# Patient Record
Sex: MALE | Race: WHITE | Employment: FULL TIME | URBAN - METROPOLITAN AREA
[De-identification: names, ages, dates, MRNs, and addresses within clinical notes are randomized per-mention and may not be internally consistent; named-entity substitution may affect disease eponyms.]

---

## 2021-12-16 ENCOUNTER — APPOINTMENT (OUTPATIENT)
Dept: GENERAL RADIOLOGY | Facility: CLINIC | Age: 56
DRG: 247 | End: 2021-12-16
Attending: EMERGENCY MEDICINE
Payer: COMMERCIAL

## 2021-12-16 ENCOUNTER — HOSPITAL ENCOUNTER (INPATIENT)
Facility: CLINIC | Age: 56
LOS: 1 days | Discharge: HOME | DRG: 247 | End: 2021-12-18
Attending: EMERGENCY MEDICINE | Admitting: EMERGENCY MEDICINE
Payer: COMMERCIAL

## 2021-12-16 ENCOUNTER — NURSE TRIAGE (OUTPATIENT)
Dept: PEDIATRICS | Facility: CLINIC | Age: 56
End: 2021-12-16
Payer: COMMERCIAL

## 2021-12-16 DIAGNOSIS — R07.9 CHEST PAIN, UNSPECIFIED TYPE: ICD-10-CM

## 2021-12-16 DIAGNOSIS — S01.512A LACERATION OF TONGUE, INITIAL ENCOUNTER: ICD-10-CM

## 2021-12-16 DIAGNOSIS — I25.118 CORONARY ARTERY DISEASE OF NATIVE ARTERY OF NATIVE HEART WITH STABLE ANGINA PECTORIS (H): Primary | ICD-10-CM

## 2021-12-16 DIAGNOSIS — I10 ESSENTIAL HYPERTENSION: ICD-10-CM

## 2021-12-16 DIAGNOSIS — Z20.822 LAB TEST NEGATIVE FOR COVID-19 VIRUS: ICD-10-CM

## 2021-12-16 LAB
ALBUMIN SERPL-MCNC: 4.2 G/DL (ref 3.4–5)
ALP SERPL-CCNC: 64 U/L (ref 40–150)
ALT SERPL W P-5'-P-CCNC: 135 U/L (ref 0–70)
ANION GAP SERPL CALCULATED.3IONS-SCNC: 4 MMOL/L (ref 3–14)
AST SERPL W P-5'-P-CCNC: 64 U/L (ref 0–45)
ATRIAL RATE - MUSE: 79 BPM
BASOPHILS # BLD AUTO: 0 10E3/UL (ref 0–0.2)
BASOPHILS NFR BLD AUTO: 1 %
BILIRUB SERPL-MCNC: 0.7 MG/DL (ref 0.2–1.3)
BUN SERPL-MCNC: 23 MG/DL (ref 7–30)
CALCIUM SERPL-MCNC: 9.7 MG/DL (ref 8.5–10.1)
CHLORIDE BLD-SCNC: 107 MMOL/L (ref 94–109)
CO2 SERPL-SCNC: 26 MMOL/L (ref 20–32)
CREAT SERPL-MCNC: 0.98 MG/DL (ref 0.66–1.25)
DIASTOLIC BLOOD PRESSURE - MUSE: NORMAL MMHG
EOSINOPHIL # BLD AUTO: 0.1 10E3/UL (ref 0–0.7)
EOSINOPHIL NFR BLD AUTO: 1 %
ERYTHROCYTE [DISTWIDTH] IN BLOOD BY AUTOMATED COUNT: 11.5 % (ref 10–15)
GFR SERPL CREATININE-BSD FRML MDRD: 86 ML/MIN/1.73M2
GLUCOSE BLD-MCNC: 90 MG/DL (ref 70–99)
HCT VFR BLD AUTO: 45.5 % (ref 40–53)
HGB BLD-MCNC: 15.7 G/DL (ref 13.3–17.7)
HOLD SPECIMEN: NORMAL
HOLD SPECIMEN: NORMAL
IMM GRANULOCYTES # BLD: 0 10E3/UL
IMM GRANULOCYTES NFR BLD: 0 %
INTERPRETATION ECG - MUSE: NORMAL
LACTATE SERPL-SCNC: 0.8 MMOL/L (ref 0.7–2)
LYMPHOCYTES # BLD AUTO: 2.1 10E3/UL (ref 0.8–5.3)
LYMPHOCYTES NFR BLD AUTO: 43 %
MCH RBC QN AUTO: 34.1 PG (ref 26.5–33)
MCHC RBC AUTO-ENTMCNC: 34.5 G/DL (ref 31.5–36.5)
MCV RBC AUTO: 99 FL (ref 78–100)
MONOCYTES # BLD AUTO: 0.6 10E3/UL (ref 0–1.3)
MONOCYTES NFR BLD AUTO: 13 %
NEUTROPHILS # BLD AUTO: 2 10E3/UL (ref 1.6–8.3)
NEUTROPHILS NFR BLD AUTO: 42 %
NRBC # BLD AUTO: 0 10E3/UL
NRBC BLD AUTO-RTO: 0 /100
NT-PROBNP SERPL-MCNC: 55 PG/ML (ref 0–900)
P AXIS - MUSE: 51 DEGREES
PLATELET # BLD AUTO: 190 10E3/UL (ref 150–450)
POTASSIUM BLD-SCNC: 4.5 MMOL/L (ref 3.4–5.3)
PR INTERVAL - MUSE: 156 MS
PROT SERPL-MCNC: 8.1 G/DL (ref 6.8–8.8)
QRS DURATION - MUSE: 78 MS
QT - MUSE: 380 MS
QTC - MUSE: 435 MS
R AXIS - MUSE: -4 DEGREES
RBC # BLD AUTO: 4.61 10E6/UL (ref 4.4–5.9)
SARS-COV-2 RNA RESP QL NAA+PROBE: NEGATIVE
SODIUM SERPL-SCNC: 137 MMOL/L (ref 133–144)
SYSTOLIC BLOOD PRESSURE - MUSE: NORMAL MMHG
T AXIS - MUSE: -4 DEGREES
TROPONIN I SERPL HS-MCNC: 30 NG/L
TROPONIN I SERPL HS-MCNC: 57 NG/L
VENTRICULAR RATE- MUSE: 79 BPM
WBC # BLD AUTO: 4.9 10E3/UL (ref 4–11)

## 2021-12-16 PROCEDURE — 84484 ASSAY OF TROPONIN QUANT: CPT | Performed by: EMERGENCY MEDICINE

## 2021-12-16 PROCEDURE — 250N000013 HC RX MED GY IP 250 OP 250 PS 637: Performed by: EMERGENCY MEDICINE

## 2021-12-16 PROCEDURE — G0378 HOSPITAL OBSERVATION PER HR: HCPCS

## 2021-12-16 PROCEDURE — 36415 COLL VENOUS BLD VENIPUNCTURE: CPT | Performed by: EMERGENCY MEDICINE

## 2021-12-16 PROCEDURE — 83605 ASSAY OF LACTIC ACID: CPT | Performed by: EMERGENCY MEDICINE

## 2021-12-16 PROCEDURE — 85025 COMPLETE CBC W/AUTO DIFF WBC: CPT | Performed by: EMERGENCY MEDICINE

## 2021-12-16 PROCEDURE — 83880 ASSAY OF NATRIURETIC PEPTIDE: CPT | Performed by: EMERGENCY MEDICINE

## 2021-12-16 PROCEDURE — U0003 INFECTIOUS AGENT DETECTION BY NUCLEIC ACID (DNA OR RNA); SEVERE ACUTE RESPIRATORY SYNDROME CORONAVIRUS 2 (SARS-COV-2) (CORONAVIRUS DISEASE [COVID-19]), AMPLIFIED PROBE TECHNIQUE, MAKING USE OF HIGH THROUGHPUT TECHNOLOGIES AS DESCRIBED BY CMS-2020-01-R: HCPCS | Performed by: PHYSICIAN ASSISTANT

## 2021-12-16 PROCEDURE — 80053 COMPREHEN METABOLIC PANEL: CPT | Performed by: EMERGENCY MEDICINE

## 2021-12-16 PROCEDURE — 71046 X-RAY EXAM CHEST 2 VIEWS: CPT

## 2021-12-16 PROCEDURE — 84484 ASSAY OF TROPONIN QUANT: CPT | Mod: 91 | Performed by: EMERGENCY MEDICINE

## 2021-12-16 PROCEDURE — 250N000011 HC RX IP 250 OP 636: Performed by: EMERGENCY MEDICINE

## 2021-12-16 RX ORDER — ASPIRIN 81 MG/1
81 TABLET ORAL DAILY
Status: ON HOLD | COMMUNITY
End: 2021-12-18

## 2021-12-16 RX ORDER — ASPIRIN 81 MG/1
324 TABLET, CHEWABLE ORAL ONCE
Status: COMPLETED | OUTPATIENT
Start: 2021-12-16 | End: 2021-12-16

## 2021-12-16 RX ORDER — HYDRALAZINE HYDROCHLORIDE 20 MG/ML
10 INJECTION INTRAMUSCULAR; INTRAVENOUS ONCE
Status: COMPLETED | OUTPATIENT
Start: 2021-12-16 | End: 2021-12-16

## 2021-12-16 RX ADMIN — ASPIRIN 81 MG 324 MG: 81 TABLET ORAL at 14:47

## 2021-12-16 RX ADMIN — HYDRALAZINE HYDROCHLORIDE 10 MG: 20 INJECTION INTRAMUSCULAR; INTRAVENOUS at 16:38

## 2021-12-16 NOTE — Clinical Note
The first balloon was inserted into the right coronary artery and middle right coronary artery.Max pressure = 12 ulises. Total duration = 10 seconds.     Max pressure = 12 ulises. Total duration = 10 seconds.    Balloon reinflated a second time: Max pressure = 12 ulises. Total duration = 10 seconds.

## 2021-12-16 NOTE — Clinical Note
Stent deployed in the middle right coronary artery. Max pressure = 12 ulises. Total duration = 10 seconds. Balloon reinflated a second time: Max pressure = 12 ulises. Total duration = 8 seconds. 2.75x12 [employer]nergy

## 2021-12-16 NOTE — ED NOTES
Pt states chest pain has been present for a few days.  Pt also states extreme carpal tunnel that doesn't allow pt to sleep. Pt states has on/off shingles that also affects body.

## 2021-12-16 NOTE — ED PROVIDER NOTES
ED Provider Note  [facility]      History     Chief Complaint   Patient presents with     Chest Pain     HPI  Thang Cagle is a 56 year old male who presents with intermittent chest pain for the past few weeks.  The patient works as a  and states he has noted more frequent chest pain with walking and when he is running around work or going upstairs.  This pain can last throughout his time of activity.  It gets better with rest.  It is located in the substernal area of his chest.  It is not associated with radiation, shortness of breath, diaphoresis, nausea or vomiting.  This has worsened over the past 2 to 3 weeks.  He had shingles about 15 years ago and he has ongoing intermittent aching pain from this.  He initially thought his symptoms may be due to shingles but now feels like this kind of pain is different.  He does have borderline hypertension and states his blood pressure is usually in the 140s over 90s and he checks it daily at home.  He does not take any medications for blood pressure, but he takes a baby aspirin a day and also takes many vitamins including Q-10 vitamins.  He does note he has had 2 Covid shots.  He has a strong family history of heart disease.  He himself has not been to the doctor since his hernia surgery many years ago.  The patient states he used to be quite active and used to bike to work.    Social: No tobacco use, works as a   Family history mother had an MI at age 50, granddad  with an MI at age 52    Past Medical History  No past medical history on file.  No past surgical history on file.  ibuprofen (ADVIL,MOTRIN) 800 MG tablet      No Known Allergies  Family History  No family history on file.  Social History   Social History     Tobacco Use     Smoking status: Never Smoker     Smokeless tobacco: Never Used   Substance Use Topics     Alcohol use: Yes     Comment: 3-4 wk     Drug use: No      Past medical history, past surgical  history, medications, allergies, family history, and social history were reviewed with the patient. No additional pertinent items.       Review of Systems  A complete review of systems was performed with pertinent positives and negatives noted in the HPI, and all other systems negative.    Physical Exam   BP: (!) 170/109  Pulse: 88  Temp: 98  F (36.7  C)  Resp: 18  SpO2: 100 %  Physical Exam  Physical Exam   Constitutional:   well nourished, well developed, resting comfortably   HENT:   Head: Normocephalic and atraumatic.   Eyes: Conjunctivae are normal. Pupils are equal, round, and reactive to light.   pharynx has no erythema or exudate, mucous membranes are moist  Neck:   no adenopathy, no bony tenderness  Cardiovascular: regular rate and rhythm without murmurs or gallops  Pulmonary/Chest: Clear to auscultation bilaterally, with no wheezes or retractions. No respiratory distress.  GI: Soft with good bowel sounds.  Non-tender, non-distended, with no guarding, no rebound, no peritoneal signs.   Back:  No bony or CVA tenderness   Musculoskeletal:  no edema  Skin: Skin is warm and dry. No rash noted.   Neurological: alert and oriented to person, place, and time. Nonfocal exam  Psychiatric:  normal mood and affect.   ED Course      Procedures       The medical record was reviewed and interpreted.  Current labs reviewed and interpreted.  Current images reviewed and interpreted: see below.  EKG reviewed and interpreted: see below.              EKG Interpretation:      Interpreted by Geetha Garcia MD  Time reviewed: 1430 pm   Symptoms at time of EKG: see hpi   Rhythm: Normal sinus   Rate: Normal  Axis: Normal  Ectopy: None  Conduction: Normal  ST Segments/ T Waves: Non-specific ST-T wave changes  Q Waves: None  Comparison to prior: No old EKG available    Clinical Impression: Normal sinus rhythm, rate of 79 bpm, with nonspecific ST-T wave changes        Results for orders placed or performed during the hospital  encounter of 12/16/21   XR Chest 2 Views     Status: None    Narrative    CHEST TWO VIEWS 12/16/2021 3:07 PM     HISTORY: Chest pain    COMPARISON: None.    FINDINGS: Heart size and pulmonary vascularity are within normal  limits. The lungs are clear. No pneumothorax or pleural effusion.       Impression    IMPRESSION: No radiographic evidence of acute chest abnormality.     JAKY CARLIN MD         SYSTEM ID:  [id number]   Comprehensive metabolic panel     Status: Abnormal   Result Value Ref Range    Sodium 137 133 - 144 mmol/L    Potassium 4.5 3.4 - 5.3 mmol/L    Chloride 107 94 - 109 mmol/L    Carbon Dioxide (CO2) 26 20 - 32 mmol/L    Anion Gap 4 3 - 14 mmol/L    Urea Nitrogen 23 7 - 30 mg/dL    Creatinine 0.98 0.66 - 1.25 mg/dL    Calcium 9.7 8.5 - 10.1 mg/dL    Glucose 90 70 - 99 mg/dL    Alkaline Phosphatase 64 40 - 150 U/L    AST 64 (H) 0 - 45 U/L     (H) 0 - 70 U/L    Protein Total 8.1 6.8 - 8.8 g/dL    Albumin 4.2 3.4 - 5.0 g/dL    Bilirubin Total 0.7 0.2 - 1.3 mg/dL    GFR Estimate 86 >60 mL/min/1.73m2   Lactic acid whole blood     Status: Normal   Result Value Ref Range    Lactic Acid 0.8 0.7 - 2.0 mmol/L   Troponin I     Status: Normal   Result Value Ref Range    Troponin I High Sensitivity 30 <79 ng/L   BNP     Status: Normal   Result Value Ref Range    N terminal Pro BNP Inpatient 55 0 - 900 pg/mL   CBC with platelets and differential     Status: Abnormal   Result Value Ref Range    WBC Count 4.9 4.0 - 11.0 10e3/uL    RBC Count 4.61 4.40 - 5.90 10e6/uL    Hemoglobin 15.7 13.3 - 17.7 g/dL    Hematocrit 45.5 40.0 - 53.0 %    MCV 99 78 - 100 fL    MCH 34.1 (H) 26.5 - 33.0 pg    MCHC 34.5 31.5 - 36.5 g/dL    RDW 11.5 10.0 - 15.0 %    Platelet Count 190 150 - 450 10e3/uL    % Neutrophils 42 %    % Lymphocytes 43 %    % Monocytes 13 %    % Eosinophils 1 %    % Basophils 1 %    % Immature Granulocytes 0 %    NRBCs per 100 WBC 0 <1 /100    Absolute Neutrophils 2.0 1.6 - 8.3 10e3/uL    Absolute  Lymphocytes 2.1 0.8 - 5.3 10e3/uL    Absolute Monocytes 0.6 0.0 - 1.3 10e3/uL    Absolute Eosinophils 0.1 0.0 - 0.7 10e3/uL    Absolute Basophils 0.0 0.0 - 0.2 10e3/uL    Absolute Immature Granulocytes 0.0 <=0.4 10e3/uL    Absolute NRBCs 0.0 10e3/uL   Extra Blue Top Tube     Status: None   Result Value Ref Range    Hold Specimen JIC    Extra Red Top Tube     Status: None   Result Value Ref Range    Hold Specimen JIC    EKG 12 lead     Status: None   Result Value Ref Range    Systolic Blood Pressure  mmHg    Diastolic Blood Pressure  mmHg    Ventricular Rate 79 BPM    Atrial Rate 79 BPM    CT Interval 156 ms    QRS Duration 78 ms     ms    QTc 435 ms    P Axis 51 degrees    R AXIS -4 degrees    T Axis -4 degrees    Interpretation ECG       Sinus rhythm  Nonspecific ST abnormality  Abnormal ECG  Unconfirmed report - interpretation of this ECG is computer generated - see medical record for final interpretation  Confirmed by - EMERGENCY ROOM, PHYSICIAN ([id number]),  SIMI KEATING ([id number]) on 12/16/2021 3:21:04 PM     CBC with platelets differential     Status: Abnormal    Narrative    The following orders were created for panel order CBC with platelets differential.  Procedure                               Abnormality         Status                     ---------                               -----------         ------                     CBC with platelets and d...[[id number]]  Abnormal            Final result                 Please view results for these tests on the individual orders.   [place] Draw     Status: None    Narrative    The following orders were created for panel order [place] Draw.  Procedure                               Abnormality         Status                     ---------                               -----------         ------                     Extra Blue Top Tube[[id number]]                              Final result               Extra Red Top Tube[[id number]]                                Final result                 Please view results for these tests on the individual orders.     Medications   aspirin (ASA) chewable tablet 324 mg (324 mg Oral Given 12/16/21 1447)   hydrALAZINE (APRESOLINE) injection 10 mg (10 mg Intravenous Given 12/16/21 1638)        Assessments & Plan (with Medical Decision Making)     I have reviewed the nursing notes.  Emergency Department course:  The patient was seen and examined at 1420 pm.      EKG shows Normal sinus rhythm, rate of 79 bpm, with nonspecific ST-T wave changes.  I treated the patient with 4 baby aspirin p.o.    Laboratory studies show an unremarkable CBC and comprehensive metabolic panel, apart from elevated LFTs, with an AST of 64 and ALT of 135.  Lactate is normal at 0.8.  BNP is normal at 55.  Initial Troponin I is 30.    Portable chest x-ray shows IMPRESSION: No radiographic evidence of acute chest abnormality.   The patient is persistently hypertensive here.  He does state he takes his blood pressure almost daily at home and it is usually in the 140s over 90s.  However, in the ED he is initially 170/109 with a repeat of 165/118.  I treated him with hydralazine IV for hypertension.    The patient is a 56-year-old male who presents with chest pain, worsening with activity, and  worsening over the past 2 weeks.  He has a family history of coronary artery disease.  He is hypertensive and not on antihypertensive medications.  He has received aspirin p.o. and hydralazine IV.  He will be admitted to the ED observation unit on the [facility] for cardiac rule out and further risk stratification. .  He is admitted under the care of [facility] El[facility] Alvares.   A repeat troponin is pending at the time of this dictation.         I have reviewed the findings, diagnosis, plan and need for follow up with the patient.    New Prescriptions    No medications on file       Final diagnoses:   Chest pain, unspecified type   Essential hypertension       --This note was created  in part by the use of Dragon voice recognition dictation system. Inadvertent grammatical errors and typographical errors may still exist.  MD Geetha King  [facility] EMERGENCY DEPARTMENT  12/16/2021     Geetha Garcia MD  12/16/21 3135

## 2021-12-16 NOTE — Clinical Note
The first balloon was inserted into the left anterior descending and middle left anterior descending.Max pressure = 12 ulises. Total duration = 14 seconds.

## 2021-12-16 NOTE — Clinical Note
The first balloon was inserted into the left anterior descending and middle left anterior descending.Max pressure = 10 ulises. Total duration = 8 seconds.     Max pressure = 12 ulises. Total duration = 9 seconds.    Balloon reinflated a second time: Max pressure = 12 ulises. Total duration = 9 seconds.  Balloon reinflated a third time: Max pressure = 16 ulises. Total duration = 14 seconds.

## 2021-12-16 NOTE — Clinical Note
Stent deployed in the middle left anterior descending. Max pressure = 12 ulises. Total duration = 8 seconds.

## 2021-12-16 NOTE — H&P
[facility] ED Observation Admission Note    Chief Complaint   Patient presents with     Chest Pain       Assessment/Plan:  Thang Cagle is a 56 year old male who presents with intermittent chest pain for the past few weeks.  The patient works as a  and states he has noted more frequent chest pain with walking and when he is running around work or going upstairs.     1. Chest pain:   Intermittent substernal chest pain that has been going for few months, more frequent and getting worse in the last few weeks. Chest pain worse with exertion and is alleviated with rest. Denies associated SOB, radiation, palpitation, LE edema, diaphoresis, nausea or vomiting. Denies previous cardiovascular hx. He does have strong family hx of cardiac disease. Mother had an MI at age 50, grandfather  from MI complications at age 52. Denies history of DVT/PE. [place] tobacco use. In the ED, he is afebrile. /109-170/118. BP at home in the 140s to 90s. His not currently on any antihypertensives. P 88 RR 18 O2 100% RA. LAB: Na 137 K+4.5 Cr 0.98 BUN 23 AST/ALT mildly elevated 64/135 respectively. No previous value to compare on the chart. WBC 4.9 HGB 15.7. Trop x 1 negative. EKG shows non-specific T-waves otherwise no ischemic changes. Chest Xray, no radiographic evidence of acute chest abnormality. Risk Factors include HNT and family history or cardiac disease.  - Serial troponins q4h x 2 more  - Continuous telemetry  - Stress Test in the morning     - Nitro PRN  - ASA 81mg daily  - Clear liquid diet after midnight    2. Hypertension. Hypertensive in he ED. /109-170/118. BP at home in the 140s to 90s. Hydralazine 10 mg IV x 1 was administered in the ed. Will continue to monitor BP.   -Tele  -Hydralazine prn >160/90  -Consider starting antihypertensive if no improvement.  -BMP in am        Addendum:   Per nursing staff, patient did not come to [place] ed observation unit overnight due to bed availability. He had  3rd  troponin done this morning, nearly 12 hours after the 2nd troponin which was elevated. He also had a repeat EKG at 8:52 am with no significant changes. Discussed case with ED MD at [facility]. Per Dr. Gaston, he discussed case with cardiology who agreed to proceed with urgent catheterization. Plan is patient to transfer to [place] ED and undergo cardiac catheterization today. Patient failed ed observation stay at this time and will be transferred to [facility].             HPI:    Thang Cagle is a 56 year old male who presents with intermittent chest pain for the past few weeks.  The patient works as a  and states he has noted more frequent chest pain with walking and when he is running around work or going upstairs.  This pain can last throughout his time of activity.  It gets better with rest.  It is located in the substernal area of his chest.  It is not associated with radiation, shortness of breath, diaphoresis, nausea or vomiting.  This has worsened over the past 2 to 3 weeks.  He had shingles about 15 years ago and he has ongoing aching pain from this.  He initially thought it may be shingles but now feels like this kind of pain is different.  He does have borderline hypertension and states his blood pressure is usually in the 140s over 90s and he checks it daily at home.  He takes a baby aspirin a day and also takes many vitamins including every 10 vitamins.  He does note he has had 2 Covid shots.  He has a strong family history of heart disease.  He himself has not been to the doctor since his hernia surgery many years ago.  The patient states he used to be quite active and used to bike to work.         In the ED In the ED, he is afebrile. /109-170/118. BP at home in the 140s to 90s. His not currently on any antihypertensives. P 88 RR 18 O2 100% RA. LAB: Na 137 K+4.5 Cr 0.98 BUN 23 AST/ALT mildly elevated 64/135 respectively. No previous value to compare on the chart. WBC 4.9 HGB 15.7. Trop x 1  negative. EKG shows non-specific T-waves otherwise no ischemic changes. Chest Xray, no radiographic evidence of acute chest abnormality. Risk Factors include HNT and family history or cardiac disease.    On admission to the observation unit the patient was stable    History:    No past medical history on file.    No past surgical history on file.    No family history on file.    Social History     Socioeconomic History     Marital status: Single     Spouse name: Not on file     Number of children: Not on file     Years of education: Not on file     Highest education level: Not on file   Occupational History     Not on file   Tobacco Use     Smoking status: Never Smoker     Smokeless tobacco: Never Used   Substance and Sexual Activity     Alcohol use: Yes     Comment: 3-4 wk     Drug use: No     Sexual activity: Yes     Partners: Female   Other Topics Concern     Parent/sibling w/ CABG, MI or angioplasty before 65F 55M? Not Asked   Social History Narrative     Not on file     Social Determinants of Health     Financial Resource Strain: Not on file   Food Insecurity: Not on file   Transportation Needs: Not on file   Physical Activity: Not on file   Stress: Not on file   Social Connections: Not on file   Intimate Partner Violence: Not on file   Housing Stability: Not on file       No current facility-administered medications on file prior to encounter.  ibuprofen (ADVIL,MOTRIN) 800 MG tablet, Take 1 tablet by mouth every 8 hours as needed for pain.        Data:    Results for orders placed or performed during the hospital encounter of 12/16/21   XR Chest 2 Views     Status: None    Narrative    CHEST TWO VIEWS 12/16/2021 3:07 PM     HISTORY: Chest pain    COMPARISON: None.    FINDINGS: Heart size and pulmonary vascularity are within normal  limits. The lungs are clear. No pneumothorax or pleural effusion.       Impression    IMPRESSION: No radiographic evidence of acute chest abnormality.     JAKY CARLIN MD          SYSTEM ID:  [id number]   Comprehensive metabolic panel     Status: Abnormal   Result Value Ref Range    Sodium 137 133 - 144 mmol/L    Potassium 4.5 3.4 - 5.3 mmol/L    Chloride 107 94 - 109 mmol/L    Carbon Dioxide (CO2) 26 20 - 32 mmol/L    Anion Gap 4 3 - 14 mmol/L    Urea Nitrogen 23 7 - 30 mg/dL    Creatinine 0.98 0.66 - 1.25 mg/dL    Calcium 9.7 8.5 - 10.1 mg/dL    Glucose 90 70 - 99 mg/dL    Alkaline Phosphatase 64 40 - 150 U/L    AST 64 (H) 0 - 45 U/L     (H) 0 - 70 U/L    Protein Total 8.1 6.8 - 8.8 g/dL    Albumin 4.2 3.4 - 5.0 g/dL    Bilirubin Total 0.7 0.2 - 1.3 mg/dL    GFR Estimate 86 >60 mL/min/1.73m2   Lactic acid whole blood     Status: Normal   Result Value Ref Range    Lactic Acid 0.8 0.7 - 2.0 mmol/L   Troponin I     Status: Normal   Result Value Ref Range    Troponin I High Sensitivity 30 <79 ng/L   BNP     Status: Normal   Result Value Ref Range    N terminal Pro BNP Inpatient 55 0 - 900 pg/mL   CBC with platelets and differential     Status: Abnormal   Result Value Ref Range    WBC Count 4.9 4.0 - 11.0 10e3/uL    RBC Count 4.61 4.40 - 5.90 10e6/uL    Hemoglobin 15.7 13.3 - 17.7 g/dL    Hematocrit 45.5 40.0 - 53.0 %    MCV 99 78 - 100 fL    MCH 34.1 (H) 26.5 - 33.0 pg    MCHC 34.5 31.5 - 36.5 g/dL    RDW 11.5 10.0 - 15.0 %    Platelet Count 190 150 - 450 10e3/uL    % Neutrophils 42 %    % Lymphocytes 43 %    % Monocytes 13 %    % Eosinophils 1 %    % Basophils 1 %    % Immature Granulocytes 0 %    NRBCs per 100 WBC 0 <1 /100    Absolute Neutrophils 2.0 1.6 - 8.3 10e3/uL    Absolute Lymphocytes 2.1 0.8 - 5.3 10e3/uL    Absolute Monocytes 0.6 0.0 - 1.3 10e3/uL    Absolute Eosinophils 0.1 0.0 - 0.7 10e3/uL    Absolute Basophils 0.0 0.0 - 0.2 10e3/uL    Absolute Immature Granulocytes 0.0 <=0.4 10e3/uL    Absolute NRBCs 0.0 10e3/uL   Extra Blue Top Tube     Status: None   Result Value Ref Range    Hold Specimen JIC    Extra Red Top Tube     Status: None   Result Value Ref Range    Hold  Specimen [facility]    EKG 12 lead     Status: None   Result Value Ref Range    Systolic Blood Pressure  mmHg    Diastolic Blood Pressure  mmHg    Ventricular Rate 79 BPM    Atrial Rate 79 BPM    NV Interval 156 ms    QRS Duration 78 ms     ms    QTc 435 ms    P Axis 51 degrees    R AXIS -4 degrees    T Axis -4 degrees    Interpretation ECG       Sinus rhythm  Nonspecific ST abnormality  Abnormal ECG  Unconfirmed report - interpretation of this ECG is computer generated - see medical record for final interpretation  Confirmed by - EMERGENCY ROOM, PHYSICIAN ([id number]),  SIMI KEATING ([id number]) on 12/16/2021 3:21:04 PM     CBC with platelets differential     Status: Abnormal    Narrative    The following orders were created for panel order CBC with platelets differential.  Procedure                               Abnormality         Status                     ---------                               -----------         ------                     CBC with platelets and d...[[id number]]  Abnormal            Final result                 Please view results for these tests on the individual orders.   [place] Draw     Status: None    Narrative    The following orders were created for panel order [place] Draw.  Procedure                               Abnormality         Status                     ---------                               -----------         ------                     Extra Blue Top Tube[[id number]]                              Final result               Extra Red Top Tube[[id number]]                               Final result                 Please view results for these tests on the individual orders.             EKG Interpretation:      Interpreted by Geetha Garcia MD  Time reviewed: 1430 pm   Symptoms at time of EKG: see hpi   Rhythm: Normal sinus   Rate: Normal  Axis: Normal  Ectopy: None  Conduction: Normal  ST Segments/ T Waves: Non-specific ST-T wave changes  Q Waves: None  Comparison to prior: No old  EKG available     Clinical Impression: Normal sinus rhythm, rate of 79 bpm, with nonspecific ST-T wave changes    ROS:  REVIEW OF SYSTEMS:   General: fatigue   EYES: Negative for vision changes or eye problems   ENT: No problems with ears, nose or throat. No difficulty swallowing.   RESP: No coughing, wheezing or shortness of breath   CV: Positive for chest pains with exertion   GI: No nausea, vomiting, heartburn, abdominal pain, diarrhea, constipation or change in bowel habits   : No urinary frequency or dysuria, bladder or kidney problems   MUSCULOSKELETAL: No significant muscle or joint pains   NEUROLOGIC: No headaches, numbness, tingling, weakness, problems with balance or coordination   PSYCHIATRIC: No problems with anxiety, depression or mental health   HEME/IMMUNE/ALLERGY: No history of bleeding or clotting problems or anemia. No allergies or immune system problems   ENDOCRINE: No history of thyroid disease, diabetes or other endocrine disorders   SKIN: No rashes,worrisome lesions or skin problems    PCP:   CARDIAC RISK: HTN    10 point ROS negative other than the symptoms noted above.      Exam:    Vitals:  B/P: 153/112, T: 98, P: 80, R: 20    Physical Exam   Constitutional: Pt is oriented to person, place, and time.Pt appears well-developed and well-nourished.   HENT:   Head: Normocephalic and atraumatic.   Eyes: Conjunctivae are normal. Pupils are equal, round, and reactive to light.   Neck: Normal range of motion. Neck supple.   Cardiovascular: Normal rate, regular rhythm, normal heart sounds and intact distal pulses.    Pulmonary/Chest: Effort normal and breath sounds normal. No respiratory distress. Pt has no wheezes. Pt has no rales  Abdominal: Soft. Bowel sounds are normal. Pt exhibits no distension and no mass. No tenderness. Pt has no rebound and no guarding.   Musculoskeletal: Normal range of motion. Pt exhibits no edema.   Neurological: Pt is alert and oriented to person, place, and time. Normal  reflexes.   Skin: Skin is warm and dry. No rash noted.   Psychiatric: Pt has a normal mood and affect. Behavior is normal. Judgment and thought content normal.       Consults: Cardiology   FEN: NPO  DVT prophylaxis: Early ambulation  Code Status: Full  Disposition: Stable vital signs. Patient return to baseline.  All labs/images reviewed    Signed:  Eliza Alvares PA-C  December 16, 2021 at 4:45 PM

## 2021-12-16 NOTE — Clinical Note
The first balloon was inserted into the left anterior descending and middle left anterior descending.Max pressure = 12 ulises. Total duration = 11 seconds.

## 2021-12-16 NOTE — TELEPHONE ENCOUNTER
Advised ER evaluation. Do not drive yourself.   The Pt agrees with plan of care.     Genoveva Sharma RN   [facility]  -- Triage Nurse        Reason for Disposition    Chest pain lasting longer than 5 minutes and occurred in last 3 days (72 hours) (Exception: feels exactly the same as previously diagnosed heartburn and has accompanying sour taste in mouth)    Additional Information    Negative: Severe difficulty breathing (e.g., struggling for each breath, speaks in single words)    Negative: Passed out (i.e., fainted, collapsed and was not responding)    Negative: Difficult to awaken or acting confused (e.g., disoriented, slurred speech)    Negative: Shock suspected (e.g., cold/pale/clammy skin, too weak to stand, low BP, rapid pulse)    Negative: Chest pain lasting longer than 5 minutes and ANY of the following:* Over 45 years old* Over 30 years old and at least one cardiac risk factor (e.g., diabetes, high blood pressure, high cholesterol, smoker, or strong family history of heart disease)* History of heart disease (i.e., angina, heart attack, heart failure, bypass surgery, takes nitroglycerin)* Pain is crushing, pressure-like, or heavy    Negative: Heart beating < 50 beats per minute OR > 140 beats per minute    Negative: Visible sweat on face or sweat dripping down face    Negative: Sounds like a life-threatening emergency to the triager    Negative: Followed an injury to chest    Negative: SEVERE chest pain    Negative: Pain also in shoulder(s) or arm(s) or jaw    Negative: Difficulty breathing    Negative: Cocaine use within last 3 days    Negative: Major surgery in the past month    Negative: Hip or leg fracture (broken bone) in past month (or had cast on leg or ankle in past month)    Negative: Illness requiring prolonged bedrest in past month (e.g., immobilization, long hospital stay)    Negative: Long-distance travel in past month (e.g., car, bus, train, plane; with trip lasting 6 or more hours)     "Negative: History of prior 'blood clot' in leg or lungs (i.e., deep vein thrombosis, pulmonary embolism)    Negative: History of inherited increased risk of blood clots (e.g., Factor 5 Leiden, Anti-thrombin 3, Protein C or Protein S deficiency, Prothrombin mutation)    Negative: Heart beating irregularly or very rapidly    Answer Assessment - Initial Assessment Questions  1. LOCATION: \"Where does it hurt?\"        Not currently having pain. Pain is only when doing strenuous activities. Feels like heartburn, resolves once activity stops.   2. RADIATION: \"Does the pain go anywhere else?\" (e.g., into neck, jaw, arms, back)      Does not radiate.   3. ONSET: \"When did the chest pain begin?\" (Minutes, hours or days)       Chest pain started about a year ago, off and on, worse over the last month.   4. PATTERN \"Does the pain come and go, or has it been constant since it started?\"  \"Does it get worse with exertion?\"       Intermittent, with activity.   5. DURATION: \"How long does it last\" (e.g., seconds, minutes, hours)      Pain can last up to a few hours.   6. SEVERITY: \"How bad is the pain?\"  (e.g., Scale 1-10; mild, moderate, or severe)     - MILD (1-3): doesn't interfere with normal activities      - MODERATE (4-7): interferes with normal activities or awakens from sleep     - SEVERE (8-10): excruciating pain, unable to do any normal activities        When present, pain is near a 6/10.   7. CARDIAC RISK FACTORS: \"Do you have any history of heart problems or risk factors for heart disease?\" (e.g., angina, prior heart attack; diabetes, high blood pressure, high cholesterol, smoker, or strong family history of heart disease)      Strong family hx of MI at young age, has had some high BP's in the past. Not on medication. Has not been to the doctor in 25 years.   8. PULMONARY RISK FACTORS: \"Do you have any history of lung disease?\"  (e.g., blood clots in lung, asthma, emphysema, birth control pills)      None.   9. CAUSE: " "\"What do you think is causing the chest pain?\"      Unknown.   10. OTHER SYMPTOMS: \"Do you have any other symptoms?\" (e.g., dizziness, nausea, vomiting, sweating, fever, difficulty breathing, cough)        Denies any other symptoms.   11. PREGNANCY: \"Is there any chance you are pregnant?\" \"When was your last menstrual period?\"        NA    Protocols used: CHEST PAIN-A-OH      "

## 2021-12-16 NOTE — Clinical Note
The first balloon was inserted into the right coronary artery and middle right coronary artery.Max pressure = 10 ulises. Total duration = 12 seconds.

## 2021-12-17 ENCOUNTER — APPOINTMENT (OUTPATIENT)
Dept: CARDIOLOGY | Facility: CLINIC | Age: 56
DRG: 247 | End: 2021-12-17
Attending: NURSE PRACTITIONER
Payer: COMMERCIAL

## 2021-12-17 PROBLEM — I10 ESSENTIAL HYPERTENSION: Status: ACTIVE | Noted: 2021-12-17

## 2021-12-17 LAB
ACT BLD: 263 SECONDS (ref 74–150)
ACT BLD: 292 SECONDS (ref 74–150)
ATRIAL RATE - MUSE: 75 BPM
DIASTOLIC BLOOD PRESSURE - MUSE: NORMAL MMHG
ERYTHROCYTE [DISTWIDTH] IN BLOOD BY AUTOMATED COUNT: 11.9 % (ref 10–15)
HBA1C MFR BLD: 5.5 % (ref 0–5.6)
HCT VFR BLD AUTO: 48.8 % (ref 40–53)
HGB BLD-MCNC: 15.9 G/DL (ref 13.3–17.7)
HGB BLD-MCNC: 16.4 G/DL (ref 13.3–17.7)
INTERPRETATION ECG - MUSE: NORMAL
LVEF ECHO: NORMAL
MCH RBC QN AUTO: 34.5 PG (ref 26.5–33)
MCHC RBC AUTO-ENTMCNC: 33.6 G/DL (ref 31.5–36.5)
MCV RBC AUTO: 103 FL (ref 78–100)
P AXIS - MUSE: 62 DEGREES
PLATELET # BLD AUTO: 197 10E3/UL (ref 150–450)
PR INTERVAL - MUSE: 162 MS
QRS DURATION - MUSE: 70 MS
QT - MUSE: 408 MS
QTC - MUSE: 455 MS
R AXIS - MUSE: 8 DEGREES
RBC # BLD AUTO: 4.76 10E6/UL (ref 4.4–5.9)
SYSTOLIC BLOOD PRESSURE - MUSE: NORMAL MMHG
T AXIS - MUSE: 7 DEGREES
TROPONIN I SERPL HS-MCNC: 124 NG/L
TSH SERPL DL<=0.005 MIU/L-ACNC: 1.21 MU/L (ref 0.4–4)
UFH PPP CHRO-ACNC: >1.1 IU/ML
VENTRICULAR RATE- MUSE: 75 BPM
WBC # BLD AUTO: 6.2 10E3/UL (ref 4–11)

## 2021-12-17 PROCEDURE — 250N000011 HC RX IP 250 OP 636: Performed by: INTERNAL MEDICINE

## 2021-12-17 PROCEDURE — 93005 ELECTROCARDIOGRAM TRACING: CPT

## 2021-12-17 PROCEDURE — 93454 CORONARY ARTERY ANGIO S&I: CPT | Performed by: INTERNAL MEDICINE

## 2021-12-17 PROCEDURE — 99285 EMERGENCY DEPT VISIT HI MDM: CPT | Mod: 25 | Performed by: EMERGENCY MEDICINE

## 2021-12-17 PROCEDURE — 85018 HEMOGLOBIN: CPT | Performed by: STUDENT IN AN ORGANIZED HEALTH CARE EDUCATION/TRAINING PROGRAM

## 2021-12-17 PROCEDURE — 93010 ELECTROCARDIOGRAM REPORT: CPT | Mod: 77 | Performed by: EMERGENCY MEDICINE

## 2021-12-17 PROCEDURE — 250N000013 HC RX MED GY IP 250 OP 250 PS 637: Performed by: PHYSICIAN ASSISTANT

## 2021-12-17 PROCEDURE — 36415 COLL VENOUS BLD VENIPUNCTURE: CPT | Performed by: EMERGENCY MEDICINE

## 2021-12-17 PROCEDURE — C1769 GUIDE WIRE: HCPCS | Performed by: INTERNAL MEDICINE

## 2021-12-17 PROCEDURE — 250N000013 HC RX MED GY IP 250 OP 250 PS 637: Performed by: INTERNAL MEDICINE

## 2021-12-17 PROCEDURE — 96365 THER/PROPH/DIAG IV INF INIT: CPT | Performed by: EMERGENCY MEDICINE

## 2021-12-17 PROCEDURE — 80061 LIPID PANEL: CPT | Performed by: NURSE PRACTITIONER

## 2021-12-17 PROCEDURE — 250N000013 HC RX MED GY IP 250 OP 250 PS 637: Performed by: STUDENT IN AN ORGANIZED HEALTH CARE EDUCATION/TRAINING PROGRAM

## 2021-12-17 PROCEDURE — C1874 STENT, COATED/COV W/DEL SYS: HCPCS | Performed by: INTERNAL MEDICINE

## 2021-12-17 PROCEDURE — 96366 THER/PROPH/DIAG IV INF ADDON: CPT | Performed by: EMERGENCY MEDICINE

## 2021-12-17 PROCEDURE — 96375 TX/PRO/DX INJ NEW DRUG ADDON: CPT | Performed by: EMERGENCY MEDICINE

## 2021-12-17 PROCEDURE — 93010 ELECTROCARDIOGRAM REPORT: CPT | Performed by: EMERGENCY MEDICINE

## 2021-12-17 PROCEDURE — 92928 PRQ TCAT PLMT NTRAC ST 1 LES: CPT | Mod: RC | Performed by: INTERNAL MEDICINE

## 2021-12-17 PROCEDURE — C1894 INTRO/SHEATH, NON-LASER: HCPCS | Performed by: INTERNAL MEDICINE

## 2021-12-17 PROCEDURE — 250N000013 HC RX MED GY IP 250 OP 250 PS 637: Performed by: NURSE PRACTITIONER

## 2021-12-17 PROCEDURE — 84443 ASSAY THYROID STIM HORMONE: CPT | Performed by: NURSE PRACTITIONER

## 2021-12-17 PROCEDURE — 999N000208 ECHOCARDIOGRAM COMPLETE

## 2021-12-17 PROCEDURE — 250N000011 HC RX IP 250 OP 636: Performed by: NURSE PRACTITIONER

## 2021-12-17 PROCEDURE — 272N000001 HC OR GENERAL SUPPLY STERILE: Performed by: INTERNAL MEDICINE

## 2021-12-17 PROCEDURE — 99152 MOD SED SAME PHYS/QHP 5/>YRS: CPT | Performed by: INTERNAL MEDICINE

## 2021-12-17 PROCEDURE — 36415 COLL VENOUS BLD VENIPUNCTURE: CPT | Performed by: NURSE PRACTITIONER

## 2021-12-17 PROCEDURE — 85027 COMPLETE CBC AUTOMATED: CPT | Performed by: NURSE PRACTITIONER

## 2021-12-17 PROCEDURE — 99152 MOD SED SAME PHYS/QHP 5/>YRS: CPT | Mod: GC | Performed by: INTERNAL MEDICINE

## 2021-12-17 PROCEDURE — 85520 HEPARIN ASSAY: CPT | Performed by: NURSE PRACTITIONER

## 2021-12-17 PROCEDURE — 99153 MOD SED SAME PHYS/QHP EA: CPT | Performed by: INTERNAL MEDICINE

## 2021-12-17 PROCEDURE — C9803 HOPD COVID-19 SPEC COLLECT: HCPCS | Performed by: EMERGENCY MEDICINE

## 2021-12-17 PROCEDURE — 214N000001 HC R&B CCU UMMC

## 2021-12-17 PROCEDURE — 93306 TTE W/DOPPLER COMPLETE: CPT | Mod: 26 | Performed by: INTERNAL MEDICINE

## 2021-12-17 PROCEDURE — C1725 CATH, TRANSLUMIN NON-LASER: HCPCS | Performed by: INTERNAL MEDICINE

## 2021-12-17 PROCEDURE — 84484 ASSAY OF TROPONIN QUANT: CPT | Performed by: EMERGENCY MEDICINE

## 2021-12-17 PROCEDURE — 84484 ASSAY OF TROPONIN QUANT: CPT | Mod: 91 | Performed by: EMERGENCY MEDICINE

## 2021-12-17 PROCEDURE — C1887 CATHETER, GUIDING: HCPCS | Performed by: INTERNAL MEDICINE

## 2021-12-17 PROCEDURE — 36415 COLL VENOUS BLD VENIPUNCTURE: CPT | Performed by: STUDENT IN AN ORGANIZED HEALTH CARE EDUCATION/TRAINING PROGRAM

## 2021-12-17 PROCEDURE — C9600 PERC DRUG-EL COR STENT SING: HCPCS | Performed by: INTERNAL MEDICINE

## 2021-12-17 PROCEDURE — G0378 HOSPITAL OBSERVATION PER HR: HCPCS

## 2021-12-17 PROCEDURE — 93454 CORONARY ARTERY ANGIO S&I: CPT | Mod: 26 | Performed by: INTERNAL MEDICINE

## 2021-12-17 PROCEDURE — 83036 HEMOGLOBIN GLYCOSYLATED A1C: CPT | Performed by: NURSE PRACTITIONER

## 2021-12-17 PROCEDURE — 85347 COAGULATION TIME ACTIVATED: CPT

## 2021-12-17 PROCEDURE — 99221 1ST HOSP IP/OBS SF/LOW 40: CPT | Mod: 25 | Performed by: NURSE PRACTITIONER

## 2021-12-17 PROCEDURE — 255N000002 HC RX 255 OP 636: Performed by: INTERNAL MEDICINE

## 2021-12-17 PROCEDURE — 250N000009 HC RX 250: Performed by: INTERNAL MEDICINE

## 2021-12-17 DEVICE — STENT CORONARY DES SYNERGY XD MR US 2.75X28MM H7493941828270: Type: IMPLANTABLE DEVICE | Status: FUNCTIONAL

## 2021-12-17 DEVICE — STENT CORONARY DES SYNERGY XD MR US 2.75X12MM H7493941812270: Type: IMPLANTABLE DEVICE | Status: FUNCTIONAL

## 2021-12-17 RX ORDER — HEPARIN SODIUM 1000 [USP'U]/ML
INJECTION, SOLUTION INTRAVENOUS; SUBCUTANEOUS
Status: DISCONTINUED | OUTPATIENT
Start: 2021-12-17 | End: 2021-12-18 | Stop reason: HOSPADM

## 2021-12-17 RX ORDER — FENTANYL CITRATE 50 UG/ML
INJECTION, SOLUTION INTRAMUSCULAR; INTRAVENOUS
Status: DISCONTINUED | OUTPATIENT
Start: 2021-12-17 | End: 2021-12-18 | Stop reason: HOSPADM

## 2021-12-17 RX ORDER — CARVEDILOL 6.25 MG/1
6.25 TABLET ORAL 2 TIMES DAILY
Status: DISCONTINUED | OUTPATIENT
Start: 2021-12-17 | End: 2021-12-18 | Stop reason: HOSPADM

## 2021-12-17 RX ORDER — HEPARIN SODIUM 10000 [USP'U]/100ML
0-5000 INJECTION, SOLUTION INTRAVENOUS CONTINUOUS
Status: DISCONTINUED | OUTPATIENT
Start: 2021-12-17 | End: 2021-12-17

## 2021-12-17 RX ORDER — MAGNESIUM HYDROXIDE/ALUMINUM HYDROXICE/SIMETHICONE 120; 1200; 1200 MG/30ML; MG/30ML; MG/30ML
30 SUSPENSION ORAL EVERY 4 HOURS PRN
Status: DISCONTINUED | OUTPATIENT
Start: 2021-12-17 | End: 2021-12-18 | Stop reason: HOSPADM

## 2021-12-17 RX ORDER — NALOXONE HYDROCHLORIDE 0.4 MG/ML
0.2 INJECTION, SOLUTION INTRAMUSCULAR; INTRAVENOUS; SUBCUTANEOUS
Status: ACTIVE | OUTPATIENT
Start: 2021-12-17 | End: 2021-12-17

## 2021-12-17 RX ORDER — ASPIRIN 81 MG/1
81 TABLET, CHEWABLE ORAL ONCE
Status: DISCONTINUED | OUTPATIENT
Start: 2021-12-17 | End: 2021-12-17

## 2021-12-17 RX ORDER — NALOXONE HYDROCHLORIDE 0.4 MG/ML
0.4 INJECTION, SOLUTION INTRAMUSCULAR; INTRAVENOUS; SUBCUTANEOUS
Status: ACTIVE | OUTPATIENT
Start: 2021-12-17 | End: 2021-12-17

## 2021-12-17 RX ORDER — OXYCODONE HYDROCHLORIDE 10 MG/1
10 TABLET ORAL EVERY 4 HOURS PRN
Status: DISCONTINUED | OUTPATIENT
Start: 2021-12-17 | End: 2021-12-18 | Stop reason: HOSPADM

## 2021-12-17 RX ORDER — ASPIRIN 81 MG/1
81 TABLET ORAL DAILY
Status: DISCONTINUED | OUTPATIENT
Start: 2021-12-18 | End: 2021-12-18 | Stop reason: HOSPADM

## 2021-12-17 RX ORDER — OXYMETAZOLINE HYDROCHLORIDE 0.05 G/100ML
2 SPRAY NASAL
Status: DISCONTINUED | OUTPATIENT
Start: 2021-12-17 | End: 2021-12-18 | Stop reason: HOSPADM

## 2021-12-17 RX ORDER — ASPIRIN 81 MG/1
81 TABLET, CHEWABLE ORAL DAILY
Qty: 30 TABLET | Refills: 3 | Status: SHIPPED | OUTPATIENT
Start: 2021-12-17

## 2021-12-17 RX ORDER — HEPARIN SODIUM 10000 [USP'U]/100ML
0-5000 INJECTION, SOLUTION INTRAVENOUS CONTINUOUS
Status: DISCONTINUED | OUTPATIENT
Start: 2021-12-17 | End: 2021-12-17 | Stop reason: DRUGHIGH

## 2021-12-17 RX ORDER — ACETAMINOPHEN 325 MG/1
650 TABLET ORAL EVERY 4 HOURS PRN
Status: DISCONTINUED | OUTPATIENT
Start: 2021-12-17 | End: 2021-12-18 | Stop reason: HOSPADM

## 2021-12-17 RX ORDER — METOPROLOL TARTRATE 1 MG/ML
5 INJECTION, SOLUTION INTRAVENOUS
Status: DISCONTINUED | OUTPATIENT
Start: 2021-12-17 | End: 2021-12-18 | Stop reason: HOSPADM

## 2021-12-17 RX ORDER — LIDOCAINE 40 MG/G
CREAM TOPICAL
Status: CANCELLED | OUTPATIENT
Start: 2021-12-17

## 2021-12-17 RX ORDER — ASPIRIN 325 MG
325 TABLET ORAL ONCE
Status: CANCELLED | OUTPATIENT
Start: 2021-12-17 | End: 2021-12-17

## 2021-12-17 RX ORDER — LISINOPRIL 5 MG/1
5 TABLET ORAL DAILY
Status: DISCONTINUED | OUTPATIENT
Start: 2021-12-17 | End: 2021-12-18 | Stop reason: HOSPADM

## 2021-12-17 RX ORDER — OXYCODONE HYDROCHLORIDE 5 MG/1
5 TABLET ORAL EVERY 4 HOURS PRN
Status: DISCONTINUED | OUTPATIENT
Start: 2021-12-17 | End: 2021-12-18 | Stop reason: HOSPADM

## 2021-12-17 RX ORDER — ATORVASTATIN CALCIUM 40 MG/1
40 TABLET, FILM COATED ORAL DAILY
Status: DISCONTINUED | OUTPATIENT
Start: 2021-12-17 | End: 2021-12-18 | Stop reason: HOSPADM

## 2021-12-17 RX ORDER — SODIUM CHLORIDE 9 MG/ML
INJECTION, SOLUTION INTRAVENOUS CONTINUOUS
Status: CANCELLED | OUTPATIENT
Start: 2021-12-17

## 2021-12-17 RX ORDER — ATORVASTATIN CALCIUM 40 MG/1
40 TABLET, FILM COATED ORAL DAILY
Qty: 90 TABLET | Refills: 3 | Status: SHIPPED | OUTPATIENT
Start: 2021-12-17 | End: 2022-01-21

## 2021-12-17 RX ORDER — ASPIRIN 81 MG/1
81 TABLET ORAL DAILY
Status: DISCONTINUED | OUTPATIENT
Start: 2021-12-17 | End: 2021-12-17

## 2021-12-17 RX ORDER — POTASSIUM CHLORIDE 750 MG/1
40 TABLET, EXTENDED RELEASE ORAL
Status: CANCELLED | OUTPATIENT
Start: 2021-12-17

## 2021-12-17 RX ORDER — NICARDIPINE HYDROCHLORIDE 2.5 MG/ML
INJECTION INTRAVENOUS
Status: DISCONTINUED | OUTPATIENT
Start: 2021-12-17 | End: 2021-12-18 | Stop reason: HOSPADM

## 2021-12-17 RX ORDER — SODIUM CHLORIDE 9 MG/ML
INJECTION, SOLUTION INTRAVENOUS CONTINUOUS
Status: ACTIVE | OUTPATIENT
Start: 2021-12-17 | End: 2021-12-17

## 2021-12-17 RX ORDER — IOPAMIDOL 755 MG/ML
INJECTION, SOLUTION INTRAVASCULAR
Status: DISCONTINUED | OUTPATIENT
Start: 2021-12-17 | End: 2021-12-18 | Stop reason: HOSPADM

## 2021-12-17 RX ORDER — NITROGLYCERIN 5 MG/ML
VIAL (ML) INTRAVENOUS
Status: DISCONTINUED | OUTPATIENT
Start: 2021-12-17 | End: 2021-12-18 | Stop reason: HOSPADM

## 2021-12-17 RX ORDER — ONDANSETRON 2 MG/ML
4 INJECTION INTRAMUSCULAR; INTRAVENOUS EVERY 6 HOURS PRN
Status: DISCONTINUED | OUTPATIENT
Start: 2021-12-17 | End: 2021-12-18 | Stop reason: HOSPADM

## 2021-12-17 RX ORDER — FLUMAZENIL 0.1 MG/ML
0.2 INJECTION, SOLUTION INTRAVENOUS
Status: ACTIVE | OUTPATIENT
Start: 2021-12-17 | End: 2021-12-17

## 2021-12-17 RX ORDER — ACETAMINOPHEN 325 MG/1
975 TABLET ORAL EVERY 8 HOURS PRN
Status: DISCONTINUED | OUTPATIENT
Start: 2021-12-17 | End: 2021-12-17

## 2021-12-17 RX ORDER — NITROGLYCERIN 0.4 MG/1
0.4 TABLET SUBLINGUAL EVERY 5 MIN PRN
Status: DISCONTINUED | OUTPATIENT
Start: 2021-12-17 | End: 2021-12-17

## 2021-12-17 RX ORDER — FENTANYL CITRATE 50 UG/ML
25 INJECTION, SOLUTION INTRAMUSCULAR; INTRAVENOUS
Status: DISCONTINUED | OUTPATIENT
Start: 2021-12-17 | End: 2021-12-18 | Stop reason: HOSPADM

## 2021-12-17 RX ORDER — NITROGLYCERIN 0.4 MG/1
0.4 TABLET SUBLINGUAL EVERY 5 MIN PRN
Status: DISCONTINUED | OUTPATIENT
Start: 2021-12-17 | End: 2021-12-18 | Stop reason: HOSPADM

## 2021-12-17 RX ORDER — ATROPINE SULFATE 0.1 MG/ML
0.5 INJECTION INTRAVENOUS
Status: ACTIVE | OUTPATIENT
Start: 2021-12-17 | End: 2021-12-17

## 2021-12-17 RX ORDER — ONDANSETRON 2 MG/ML
4 INJECTION INTRAMUSCULAR; INTRAVENOUS
Status: ACTIVE | OUTPATIENT
Start: 2021-12-17 | End: 2021-12-17

## 2021-12-17 RX ORDER — HYDRALAZINE HYDROCHLORIDE 20 MG/ML
10 INJECTION INTRAMUSCULAR; INTRAVENOUS EVERY 4 HOURS PRN
Status: DISCONTINUED | OUTPATIENT
Start: 2021-12-17 | End: 2021-12-18 | Stop reason: HOSPADM

## 2021-12-17 RX ORDER — POTASSIUM CHLORIDE 750 MG/1
20 TABLET, EXTENDED RELEASE ORAL
Status: CANCELLED | OUTPATIENT
Start: 2021-12-17

## 2021-12-17 RX ORDER — ASPIRIN 81 MG/1
243 TABLET, CHEWABLE ORAL ONCE
Status: CANCELLED | OUTPATIENT
Start: 2021-12-17

## 2021-12-17 RX ORDER — ONDANSETRON 4 MG/1
4 TABLET, ORALLY DISINTEGRATING ORAL EVERY 6 HOURS PRN
Status: DISCONTINUED | OUTPATIENT
Start: 2021-12-17 | End: 2021-12-18 | Stop reason: HOSPADM

## 2021-12-17 RX ADMIN — PHENOL 1 ML: 1.5 LIQUID ORAL at 23:15

## 2021-12-17 RX ADMIN — ATORVASTATIN CALCIUM 40 MG: 40 TABLET, FILM COATED ORAL at 17:13

## 2021-12-17 RX ADMIN — TICAGRELOR 90 MG: 90 TABLET ORAL at 22:05

## 2021-12-17 RX ADMIN — Medication 1100 UNITS/HR: at 12:08

## 2021-12-17 RX ADMIN — HUMAN ALBUMIN MICROSPHERES AND PERFLUTREN 5 ML: 10; .22 INJECTION, SOLUTION INTRAVENOUS at 12:38

## 2021-12-17 RX ADMIN — LISINOPRIL 5 MG: 5 TABLET ORAL at 12:07

## 2021-12-17 RX ADMIN — CARVEDILOL 6.25 MG: 6.25 TABLET, FILM COATED ORAL at 20:14

## 2021-12-17 RX ADMIN — ONDANSETRON 4 MG: 2 INJECTION INTRAMUSCULAR; INTRAVENOUS at 18:11

## 2021-12-17 RX ADMIN — HYDRALAZINE HYDROCHLORIDE 10 MG: 20 INJECTION INTRAMUSCULAR; INTRAVENOUS at 17:13

## 2021-12-17 RX ADMIN — ASPIRIN 81 MG: 81 TABLET, COATED ORAL at 08:51

## 2021-12-17 ASSESSMENT — ACTIVITIES OF DAILY LIVING (ADL)
TOILETING_ISSUES: NO
WEAR_GLASSES_OR_BLIND: YES
ADLS_ACUITY_SCORE: 4
ADLS_ACUITY_SCORE: 4
DRESSING/BATHING_DIFFICULTY: NO
CONCENTRATING,_REMEMBERING_OR_MAKING_DECISIONS_DIFFICULTY: NO
DIFFICULTY_EATING/SWALLOWING: NO
WALKING_OR_CLIMBING_STAIRS_DIFFICULTY: NO
ADLS_ACUITY_SCORE: 6
ADLS_ACUITY_SCORE: 4
FALL_HISTORY_WITHIN_LAST_SIX_MONTHS: NO
ADLS_ACUITY_SCORE: 6
PATIENT_/_FAMILY_COMMUNICATION_STYLE: SPOKEN LANGUAGE (ENGLISH OR BILINGUAL)
ADLS_ACUITY_SCORE: 4
ADLS_ACUITY_SCORE: 6
ADLS_ACUITY_SCORE: 4
DOING_ERRANDS_INDEPENDENTLY_DIFFICULTY: NO
ADLS_ACUITY_SCORE: 6
ADLS_ACUITY_SCORE: 6
HEARING_DIFFICULTY_OR_DEAF: NO
ADLS_ACUITY_SCORE: 4
DIFFICULTY_COMMUNICATING: NO
ADLS_ACUITY_SCORE: 6

## 2021-12-17 NOTE — ED NOTES
8:49 AM  Patient was signed out to me is pending admission to observation.  2nd troponin was nearly double 1st troponin last resulted more than 12 hours ago.  3rd troponin was ordered and is elevated at 124.  Patient was seen and examined and has had no chest pain for the past 36 hours.  Repeat EKG at 8:52 AM        EKG Interpretation:      Interpreted by Volodymyr Gaston MD  Time reviewed: 8:52 AM  Symptoms at time of EKG: None  Rhythm: normal sinus   Rate: normal  Axis: NORMAL  Ectopy: none  Conduction: normal  ST Segments/ T Waves: Nonspecific ST abnormality Q Waves: none  Comparison to prior: Unchanged from yesterday    Clinical Impression: No acute changes  The case was discussed at length with interventional cardiology as well as [facility] emergency room physician.  Patient will be sent to the [facility] to be seen by cardiology for urgent catheterization.     Volodymyr Gaston MD  12/17/21 0900

## 2021-12-17 NOTE — PHARMACY-ADMISSION MEDICATION HISTORY
Admission Medication History Completed by Pharmacy    See [facility] Admission Navigator for allergy information, preferred outpatient pharmacy, prior to admission medications and immunization status.     Medication History Sources:     Patient, Surescripts    Changes made to PTA medication list (reason):    Added: ASA 81 mg    Deleted:   o Ibuprofen 800 m tab PO q8h PRN (per patient)    Changed: None    Additional Information:    None    Prior to Admission medications    Medication Sig Last Dose Taking? Auth Provider   aspirin 81 MG EC tablet Take 81 mg by mouth daily 2021 Yes Reported, Patient       Date completed: 21    Medication history completed by: Cordelia Lowry, 4th year PharmD student

## 2021-12-17 NOTE — H&P
I have seen and examined the patient with the CSI team. I agree with the assessment and plan of the note above.I have reviewed pertinent labs.     Phong Eli MD  Interventional Cardiology  Pager: [phone]    [facility]      CSI History and Physicial  Thang Cagle MRN: [id number]  1965  Date of Admission:12/16/2021  Primary care provider: No Ref-Primary, Physician      Assessment and Plan:     Thang Cagle is a pleasant 56 y.o male with no past medical history who is being admitted for NSTEMI.      # NSTEMI  # HTN  Presents with intermittent exertional chest pain for the last few months, more frequent in the last few weeks. Pain is relieved by rest. Denies radiation or associated SOB, dizziness, diaphoresis, n/v, or palpitations. EKG with no acute ST or T wave changes. Troponin elevation consistent with NSTEMI. SBP 's. Loaded with 324 mg ASA in ED. Denies previous cardiac history. He does have strong family hx, mother had MI in her 50's. Denies tobacco use. Risk factors include family history and HTN. Plan to proceed with coronary angiogram today. Renal and electrolytes normal. All risks and benefits for this procedure have been explained to this patient and accepted.  This includes but is not limited to death, heart attack, stroke, blood clots, bleeding including the need for blood transfusion and the risk thereof, allergic reaction to x-ray dye, arrhythmia necessitating cardioversion, dye nephropathy.  We talked about intracoronary stenting and the risks thereof and bypass surgery. No history of bleeding problems or current bleeding, and no scheduled surgeries or procedures in the next year. Patient understands and wishes to proceed with it.     - Monitor on telemetry  - Start heparin gtt  - Continue PTA aspirin 81 mg daily   - Start lisinopril 5 mg daily   - EKG prn  - Sublingual nitroglycerin prn for chest pain   - Obtain lipid panel/A1C/TSH  - Echocardiogram  -  NPO for coronary angiogram today, consent signed     FEN: NPO  Disposition: Admit to CSI   Code Status: FULL CODE    Genoveva Trevor DNP, APRN, CNP  [facility] Interventional Cardiology Team  [phone]           Chief Complaint:   Chest pain          History of Present Illness:   Thang Cagle is a 56 year old male who presents with intermittent chest pain for the past few weeks.  The patient works as a  and states he has noted more frequent chest pain with walking and when he is running around work or going upstairs.  This pain can last throughout his time of activity.  It gets better with rest.  It is located in the substernal area of his chest.  It is not associated with radiation, shortness of breath, diaphoresis, nausea or vomiting.  This has worsened over the past 2 to 3 weeks.  He had shingles about 15 years ago and he has ongoing aching pain from this.  He initially thought it may be shingles but now feels like this kind of pain is different.  He does have borderline hypertension and states his blood pressure is usually in the 140s over 90s and he checks it daily at home.  He takes a baby aspirin a day and also takes many vitamins including every 10 vitamins.  He does note he has had 2 Covid shots.  He has a strong family history of heart disease.  He himself has not been to the doctor since his hernia surgery many years ago. The patient states he used to be quite active and used to bike to work.     In the ED, patient is hemodynamically stable. SBP > 150. EKG shows NSR with no acute changes. Initial HS troponin 30, repeat troponin 2-hours later 57, third recheck > 12 hours after 2nd was 124 prompting admission for NSTEMI. Denies chest pain, SOB, dizziness, n/v, or palpitations.          Review of Systems:    10 point review of systems negative except for stated above in HPI.          Past Medical History:   Medical History reviewed.   No past medical history on file.          Past Surgical History:    Surgical History reviewed.   No past surgical history on file.          Social History:   Social History reviewed.  Social History     Tobacco Use    Smoking status: Never Smoker    Smokeless tobacco: Never Used   Substance Use Topics    Alcohol use: Yes     Comment: 3-4 wk             Family History:   Family History reviewed.   No family history on file.          Allergies:   No Known Allergies          Medications:   Medications Reviewed.   Current Facility-Administered Medications   Medication    acetaminophen (TYLENOL) tablet 975 mg    alum & mag hydroxide-simethicone (MAALOX) suspension 30 mL    aspirin EC tablet 81 mg    heparin infusion 25,000 units in D5W 250 mL ANTICOAGULANT    lisinopril (ZESTRIL) tablet 5 mg    nitroGLYcerin (NITROSTAT) sublingual tablet 0.4 mg     Current Outpatient Medications   Medication Sig    aspirin 81 MG EC tablet Take 81 mg by mouth daily             Physical Exam:   Vitals were reviewed.  Blood pressure (!) 135/104, pulse 90, temperature 98  F (36.7  C), temperature source Oral, resp. rate 16, weight 93.4 kg (206 lb), SpO2 96 %.    General: AAOx3, NAD  Skin: Not jaundiced, no rash, no ecchymoses  HEENT: MMM, PERRLA, EOM intact  CV: RRR, normal S1S2, no murmur, clicks, rubs  Resp: Clear to auscultation bilaterally, no wheezes, rhonchi  Abd: Soft, non-tender, BS+, no masses appreciated  Extremities: warm and well perfused, palpable pulses, no edema  Neuro: No lateralizing symptoms or focal neurologic deficits        Labs:   Routine Labs:  No results found for: TROPI, TROPONIN, TROPR, TROPN  CMP  Recent Labs   Lab 12/16/21  1448      POTASSIUM 4.5   CHLORIDE 107   CO2 26   ANIONGAP 4   GLC 90   BUN 23   CR 0.98   GFRESTIMATED 86   DOMINIQUE 9.7   PROTTOTAL 8.1   ALBUMIN 4.2   BILITOTAL 0.7   ALKPHOS 64   AST 64*   *     CBC  Recent Labs   Lab 12/16/21  1448   WBC 4.9   RBC 4.61   HGB 15.7   HCT 45.5   MCV 99   MCH 34.1*   MCHC 34.5   RDW 11.5        INRNo lab  results found in last 7 days.        Diagnostics:    EKG 12Lead: 12/17/21

## 2021-12-17 NOTE — PROGRESS NOTES
Patient interviewed and examined. Labs, imaging and chart notes reviewed.  Thang Cagle presented to the [facility] yesterday with intermittent exertional chest pain over the past few weeks which resolves with rest. There is no radiation, dyspnea, or nausea associated with the pain. He was hypertensive on arrival to the ED and has had borderline hypertension in the past. He had a distant history of shingles, but the current pain is different in character. EKG in the ED had non specific ST-T changes. CXR was clear. CBC, electrolytes and creatinine were normal. AST and ALT were mildly elevated. CBC was normal. Initial troponin was normal. Repeat troponin this morning is elevated. He has a family history of ASCVD in his mother age 50 and a grandparent.    PMH:  Borderline HTN.  Past shingles.  Carpal tunnel syndrome      No past surgical history on file.    No family history on file.    Social History     Tobacco Use     Smoking status: Never Smoker     Smokeless tobacco: Never Used   Substance Use Topics     Alcohol use: Yes     Comment: 3-4 wk     Physical Exam:  Middle aged male in NAD.  BP (!) 135/104   Pulse 90   Temp 98  F (36.7  C) (Oral)   Resp 16   SpO2 96%   HEENT: PERRLA. EOMI. Anicteric.  Neck: No bruit.  Lungs: Clear to auscultation.  Cardiac:RRR. S1 and S2. No JVD. Radial, carotid, PT, DP pulses intact.  Abdomen: Soft, non tender.  Extrem: No edema.  Neuro: CN, speech, motor, coordination intact.  Psych: Normal mood and affect.    Labs/Imaging    Results for orders placed or performed during the hospital encounter of 12/16/21 (from the past 24 hour(s))   EKG 12 lead   Result Value Ref Range    Systolic Blood Pressure  mmHg    Diastolic Blood Pressure  mmHg    Ventricular Rate 79 BPM    Atrial Rate 79 BPM    SD Interval 156 ms    QRS Duration 78 ms     ms    QTc 435 ms    P Axis 51 degrees    R AXIS -4 degrees    T Axis -4 degrees    Interpretation ECG       Sinus rhythm  Nonspecific ST  abnormality  Abnormal ECG  Unconfirmed report - interpretation of this ECG is computer generated - see medical record for final interpretation  Confirmed by - EMERGENCY ROOM, PHYSICIAN ([id number]),  SIMI KEATING ([id number]) on 12/16/2021 3:21:04 PM     CBC with platelets differential    Narrative    The following orders were created for panel order CBC with platelets differential.  Procedure                               Abnormality         Status                     ---------                               -----------         ------                     CBC with platelets and d...[[id number]]  Abnormal            Final result                 Please view results for these tests on the individual orders.   Comprehensive metabolic panel   Result Value Ref Range    Sodium 137 133 - 144 mmol/L    Potassium 4.5 3.4 - 5.3 mmol/L    Chloride 107 94 - 109 mmol/L    Carbon Dioxide (CO2) 26 20 - 32 mmol/L    Anion Gap 4 3 - 14 mmol/L    Urea Nitrogen 23 7 - 30 mg/dL    Creatinine 0.98 0.66 - 1.25 mg/dL    Calcium 9.7 8.5 - 10.1 mg/dL    Glucose 90 70 - 99 mg/dL    Alkaline Phosphatase 64 40 - 150 U/L    AST 64 (H) 0 - 45 U/L     (H) 0 - 70 U/L    Protein Total 8.1 6.8 - 8.8 g/dL    Albumin 4.2 3.4 - 5.0 g/dL    Bilirubin Total 0.7 0.2 - 1.3 mg/dL    GFR Estimate 86 >60 mL/min/1.73m2   Lactic acid whole blood   Result Value Ref Range    Lactic Acid 0.8 0.7 - 2.0 mmol/L   Troponin I   Result Value Ref Range    Troponin I High Sensitivity 30 <79 ng/L   BNP   Result Value Ref Range    N terminal Pro BNP Inpatient 55 0 - 900 pg/mL   CBC with platelets and differential   Result Value Ref Range    WBC Count 4.9 4.0 - 11.0 10e3/uL    RBC Count 4.61 4.40 - 5.90 10e6/uL    Hemoglobin 15.7 13.3 - 17.7 g/dL    Hematocrit 45.5 40.0 - 53.0 %    MCV 99 78 - 100 fL    MCH 34.1 (H) 26.5 - 33.0 pg    MCHC 34.5 31.5 - 36.5 g/dL    RDW 11.5 10.0 - 15.0 %    Platelet Count 190 150 - 450 10e3/uL    % Neutrophils 42 %    % Lymphocytes 43 %    %  Monocytes 13 %    % Eosinophils 1 %    % Basophils 1 %    % Immature Granulocytes 0 %    NRBCs per 100 WBC 0 <1 /100    Absolute Neutrophils 2.0 1.6 - 8.3 10e3/uL    Absolute Lymphocytes 2.1 0.8 - 5.3 10e3/uL    Absolute Monocytes 0.6 0.0 - 1.3 10e3/uL    Absolute Eosinophils 0.1 0.0 - 0.7 10e3/uL    Absolute Basophils 0.0 0.0 - 0.2 10e3/uL    Absolute Immature Granulocytes 0.0 <=0.4 10e3/uL    Absolute NRBCs 0.0 10e3/uL   [place] Draw    Narrative    The following orders were created for panel order [place] Draw.  Procedure                               Abnormality         Status                     ---------                               -----------         ------                     Extra Blue Top Tube[[id number]]                              Final result               Extra Red Top Tube[[id number]]                               Final result                 Please view results for these tests on the individual orders.   Extra Blue Top Tube   Result Value Ref Range    Hold Specimen JIC    Extra Red Top Tube   Result Value Ref Range    Hold Specimen JIC    XR Chest 2 Views    Narrative    CHEST TWO VIEWS 12/16/2021 3:07 PM     HISTORY: Chest pain    COMPARISON: None.    FINDINGS: Heart size and pulmonary vascularity are within normal  limits. The lungs are clear. No pneumothorax or pleural effusion.       Impression    IMPRESSION: No radiographic evidence of acute chest abnormality.     JAKY CARLIN MD         SYSTEM ID:  [id number]   Asymptomatic COVID-19 Virus (Coronavirus) by PCR Nasopharyngeal    Specimen: Nasopharyngeal; Swab   Result Value Ref Range    SARS CoV2 PCR Negative Negative    Narrative    Testing was performed using the cliff  SARS-CoV-2 & Influenza A/B Assay on the cliff  [employer]  System.  This test should be ordered for the detection of SARS-COV-2 in individuals who meet SARS-CoV-2 clinical and/or epidemiological criteria. Test performance is unknown in asymptomatic patients.  This test is for in  vitro diagnostic use under the FDA EUA for laboratories certified under CLIA to perform moderate and/or high complexity testing. This test has not been FDA cleared or approved.  A negative test does not rule out the presence of PCR inhibitors in the specimen or target RNA in concentration below the limit of detection for the assay. The possibility of a false negative should be considered if the patient's recent exposure or clinical presentation suggests COVID-19.  [facility] are certified under the Clinical Laboratory Improvement Amendments of 1988 (CLIA-88) as qualified to perform moderate and/or high complexity laboratory testing.   Troponin I   Result Value Ref Range    Troponin I High Sensitivity 57 <79 ng/L   Troponin I (third draw)   Result Value Ref Range    Troponin I High Sensitivity 124 (HH) <79 ng/L   EKG 12-lead, tracing only   Result Value Ref Range    Systolic Blood Pressure  mmHg    Diastolic Blood Pressure  mmHg    Ventricular Rate 75 BPM    Atrial Rate 75 BPM    TX Interval 162 ms    QRS Duration 70 ms     ms    QTc 455 ms    P Axis 62 degrees    R AXIS 8 degrees    T Axis 7 degrees    Interpretation ECG       Sinus rhythm  Nonspecific ST abnormality  Abnormal ECG             Impression:  Middle aged male with a history of borderline HTN and family history of CAD presents with new onset of fairly typical exertional angina resolving with rest over the past few weeks to months. He was hypertensive on arrival. He did have an interval elevation of second troponin this morning. He was discussed with cardiology by a prior provider. He will be placed on the [facility] cardiology service for likely coronary angiogram rather than stress testing given the troponin elevation.    Augie Franco MD

## 2021-12-17 NOTE — PROGRESS NOTES
CLINICAL NUTRITION SERVICES    Reason for Assessment:  Heart-healthy nutrition education, received consult.    Diet History:  Unknown if pt has received heart-healthy nutrition education in the past. Pt had just arrived on the unit and was busy with RN.      Nutrition Diagnosis:  Unable to assess    Nutrition Prescription/Recs:  Continue heart-healthy diet.      Interventions:  Nutrition Education: Attempted to provide verbal instruction on a heart-healthy diet. Pt had just arrived on the unit and was busy with RN. Provided handouts: How to Read Nutrition Labels and Nutrition Care Manual Handouts on General, Healthful Nutrition Therapy.     Goals:    Pt will list at least two interventions to make current meal plan more heart-healthy.     Follow-up:   Patient to ask any further nutrition-related questions before discharge. In addition, pt may request outpatient RD appointment.     Erika Beth, MS, RD, LD, [facility]   6C Pgr: [phone]

## 2021-12-17 NOTE — UTILIZATION REVIEW
"  Admission Status; Secondary Review Determination     Admission Date: 12/16/2021  2:14 PM      Under the authority of the Utilization Management Committee, the utilization review process indicated a secondary review on the above patient.  The review outcome is based on review of the medical records, discussions with staff, and applying clinical experience noted on the date of the review.        (x)      Inpatient Status Appropriate - This patient's medical care is consistent with medical management for inpatient care and reasonable inpatient medical practice.      () Observation Status Appropriate - This patient does not meet hospital inpatient criteria and is placed in observation status. If this patient's primary payer is Medicare and was admitted as an inpatient, Condition Code 44 should be used and patient status changed to \"observation\".   () Admission Status NOT Appropriate - This patient's medical care is not consistent with medical management for Inpatient or Observation Status.          RATIONALE FOR DETERMINATION   Thang Cagle is a 56 year old male with no significant past medical problems.  He presented to the emergency room with chest pain.  Found to have non-ST elevation myocardial infarction with a small troponin elevation.  He has been started on continuous IV heparin infusion.  He does need further evaluation of his NSTEMI.  Due to this patient's NSTEMI, with need for IV heparin, and need for further evaluation with likely coronary angiogram, it is appropriate to admit him to the hospital as an inpatient.      The severity of illness, intensity of service provided, expected LOS and risk for adverse outcome make the care complex, high risk and appropriate for hospital admission.        The information on this document is developed by the utilization review team in order for the business office to ensure compliance.  This only denotes the appropriateness of proper admission status and does " not reflect the quality of care rendered.         The definitions of Inpatient Status and Observation Status used in making the determination above are those provided in the CMS Coverage Manual, Chapter 1 and Chapter 6, section 70.4.      Sincerely,     Ion Hardwick D.O.  Utilization Review/ Case Management  [facility].

## 2021-12-18 ENCOUNTER — APPOINTMENT (OUTPATIENT)
Dept: OCCUPATIONAL THERAPY | Facility: CLINIC | Age: 56
DRG: 247 | End: 2021-12-18
Attending: NURSE PRACTITIONER
Payer: COMMERCIAL

## 2021-12-18 VITALS
BODY MASS INDEX: 28.73 KG/M2 | OXYGEN SATURATION: 96 % | TEMPERATURE: 98.1 F | RESPIRATION RATE: 16 BRPM | SYSTOLIC BLOOD PRESSURE: 109 MMHG | WEIGHT: 206 LBS | HEART RATE: 89 BPM | DIASTOLIC BLOOD PRESSURE: 69 MMHG

## 2021-12-18 LAB
ANION GAP SERPL CALCULATED.3IONS-SCNC: 6 MMOL/L (ref 3–14)
BUN SERPL-MCNC: 19 MG/DL (ref 7–30)
CALCIUM SERPL-MCNC: 8.4 MG/DL (ref 8.5–10.1)
CHLORIDE BLD-SCNC: 108 MMOL/L (ref 94–109)
CHOLEST SERPL-MCNC: 293 MG/DL
CO2 SERPL-SCNC: 24 MMOL/L (ref 20–32)
CREAT SERPL-MCNC: 0.98 MG/DL (ref 0.66–1.25)
ERYTHROCYTE [DISTWIDTH] IN BLOOD BY AUTOMATED COUNT: 12.2 % (ref 10–15)
GFR SERPL CREATININE-BSD FRML MDRD: 86 ML/MIN/1.73M2
GLUCOSE BLD-MCNC: 105 MG/DL (ref 70–99)
HCT VFR BLD AUTO: 43.8 % (ref 40–53)
HDLC SERPL-MCNC: 66 MG/DL
HGB BLD-MCNC: 14.9 G/DL (ref 13.3–17.7)
LDLC SERPL CALC-MCNC: 208 MG/DL
MCH RBC QN AUTO: 34.7 PG (ref 26.5–33)
MCHC RBC AUTO-ENTMCNC: 34 G/DL (ref 31.5–36.5)
MCV RBC AUTO: 102 FL (ref 78–100)
NONHDLC SERPL-MCNC: 227 MG/DL
PLATELET # BLD AUTO: 186 10E3/UL (ref 150–450)
POTASSIUM BLD-SCNC: 4 MMOL/L (ref 3.4–5.3)
RBC # BLD AUTO: 4.29 10E6/UL (ref 4.4–5.9)
SODIUM SERPL-SCNC: 138 MMOL/L (ref 133–144)
TRIGL SERPL-MCNC: 94 MG/DL
WBC # BLD AUTO: 9.4 10E3/UL (ref 4–11)

## 2021-12-18 PROCEDURE — 5A2204Z RESTORATION OF CARDIAC RHYTHM, SINGLE: ICD-10-PCS | Performed by: INTERNAL MEDICINE

## 2021-12-18 PROCEDURE — 36415 COLL VENOUS BLD VENIPUNCTURE: CPT | Performed by: NURSE PRACTITIONER

## 2021-12-18 PROCEDURE — 99238 HOSP IP/OBS DSCHRG MGMT 30/<: CPT | Performed by: STUDENT IN AN ORGANIZED HEALTH CARE EDUCATION/TRAINING PROGRAM

## 2021-12-18 PROCEDURE — 97530 THERAPEUTIC ACTIVITIES: CPT | Mod: GO

## 2021-12-18 PROCEDURE — 80061 LIPID PANEL: CPT | Performed by: PHYSICIAN ASSISTANT

## 2021-12-18 PROCEDURE — 027134Z DILATION OF CORONARY ARTERY, TWO ARTERIES WITH DRUG-ELUTING INTRALUMINAL DEVICE, PERCUTANEOUS APPROACH: ICD-10-PCS | Performed by: INTERNAL MEDICINE

## 2021-12-18 PROCEDURE — 93005 ELECTROCARDIOGRAM TRACING: CPT

## 2021-12-18 PROCEDURE — 250N000013 HC RX MED GY IP 250 OP 250 PS 637: Performed by: NURSE PRACTITIONER

## 2021-12-18 PROCEDURE — 82310 ASSAY OF CALCIUM: CPT | Performed by: NURSE PRACTITIONER

## 2021-12-18 PROCEDURE — 97165 OT EVAL LOW COMPLEX 30 MIN: CPT | Mod: GO

## 2021-12-18 PROCEDURE — B2111ZZ FLUOROSCOPY OF MULTIPLE CORONARY ARTERIES USING LOW OSMOLAR CONTRAST: ICD-10-PCS | Performed by: INTERNAL MEDICINE

## 2021-12-18 PROCEDURE — 250N000011 HC RX IP 250 OP 636: Performed by: STUDENT IN AN ORGANIZED HEALTH CARE EDUCATION/TRAINING PROGRAM

## 2021-12-18 PROCEDURE — C9113 INJ PANTOPRAZOLE SODIUM, VIA: HCPCS | Performed by: STUDENT IN AN ORGANIZED HEALTH CARE EDUCATION/TRAINING PROGRAM

## 2021-12-18 PROCEDURE — 85027 COMPLETE CBC AUTOMATED: CPT | Performed by: NURSE PRACTITIONER

## 2021-12-18 RX ORDER — CHLORHEXIDINE GLUCONATE ORAL RINSE 1.2 MG/ML
15 SOLUTION DENTAL 2 TIMES DAILY
Qty: 473 ML | Refills: 0 | Status: SHIPPED | OUTPATIENT
Start: 2021-12-18 | End: 2023-04-20

## 2021-12-18 RX ORDER — CARVEDILOL 6.25 MG/1
6.25 TABLET ORAL 2 TIMES DAILY
Qty: 180 TABLET | Refills: 1 | Status: SHIPPED | OUTPATIENT
Start: 2021-12-18 | End: 2022-10-20

## 2021-12-18 RX ORDER — LISINOPRIL 5 MG/1
5 TABLET ORAL DAILY
Qty: 90 TABLET | Refills: 1 | Status: SHIPPED | OUTPATIENT
Start: 2021-12-18 | End: 2022-12-09

## 2021-12-18 RX ORDER — NITROGLYCERIN 0.4 MG/1
TABLET SUBLINGUAL
Qty: 25 TABLET | Refills: 1 | Status: SHIPPED | OUTPATIENT
Start: 2021-12-18

## 2021-12-18 RX ADMIN — LISINOPRIL 5 MG: 5 TABLET ORAL at 08:45

## 2021-12-18 RX ADMIN — PHENOL 1 ML: 1.5 LIQUID ORAL at 03:45

## 2021-12-18 RX ADMIN — TICAGRELOR 90 MG: 90 TABLET ORAL at 09:34

## 2021-12-18 RX ADMIN — PANTOPRAZOLE SODIUM 40 MG: 40 INJECTION, POWDER, FOR SOLUTION INTRAVENOUS at 08:45

## 2021-12-18 RX ADMIN — CARVEDILOL 6.25 MG: 6.25 TABLET, FILM COATED ORAL at 08:44

## 2021-12-18 RX ADMIN — ASPIRIN 81 MG: 81 TABLET ORAL at 08:44

## 2021-12-18 RX ADMIN — ATORVASTATIN CALCIUM 40 MG: 40 TABLET, FILM COATED ORAL at 08:45

## 2021-12-18 RX ADMIN — PHENOL 1 ML: 1.5 LIQUID ORAL at 01:30

## 2021-12-18 ASSESSMENT — ACTIVITIES OF DAILY LIVING (ADL)
PREVIOUS_RESPONSIBILITIES: MEAL PREP;HOUSEKEEPING;LAUNDRY;SHOPPING;YARDWORK;MEDICATION MANAGEMENT;FINANCES;DRIVING;WORK
ADLS_ACUITY_SCORE: 3
ADLS_ACUITY_SCORE: 4
ADLS_ACUITY_SCORE: 4
ADLS_ACUITY_SCORE: 3
ADLS_ACUITY_SCORE: 4
ADLS_ACUITY_SCORE: 3
ADLS_ACUITY_SCORE: 4
ADLS_ACUITY_SCORE: 3

## 2021-12-18 NOTE — PLAN OF CARE
D: Admitted 12/16 for chest pain, pt had NSTEMI s/p stent x3 on 12/17  Hx: Borderline HTN    I: Monitored vitals and assessed pt status.     A: A0x4. VSS, on RA. NSR. Afebrile. Pt denies chest pain and SOB. Pt ambulates to bathroom to void. Low sat fat <2400mg Na+ diet. Pt denies nausea. Pt's tongue was not actively bleeding and is swollen (left side of tongue changed from bright red to purple)-team aware. R PIV SL. R radial site covered with primapore dressing, no drainage, and CMS intact.    P: Continue to monitor Pt status and report changes to CSI team. Plan to discharge to home today.

## 2021-12-18 NOTE — PLAN OF CARE
TR band successfully deflated per orders. Site CDI, no bleeding, bruising, hematoma, or pain. Activity restrictions reinforced with patient and arm board left in place. VSS. Sinus rhythm on the monitor. One episode of nausea on previous shift and patient denies nausea now. Tolerating PO intake. Voiding. Denies pain in chest or wrist, but does complain of discomfort on tongue. Chloraseptic ordered and will be given tonight for comfort. Plan to continue to monitor patient overnight. Notify CSI with changes or concerns.

## 2021-12-18 NOTE — PROGRESS NOTES
DISCHARGE                         12/18/2021 12:11 PM  ----------------------------------------------------------------------------  Discharged to: Home  Via: Automobile  Accompanied by: Itzel (significant other)  Discharge Instructions: diet, activity, medications, follow up appointments, when to call the MD, aftercare instructions, and what to watchout for (i.e. s/s of infection, increasing SOB, palpitations, chest pain,). Pt instructed about weight and activity restrictions with R wrist, how to care for site, signs of MI, diet, and s/s of infection around radial site.  Prescriptions: To be filled by [facility] discharge pharmacy per pt's request; medication list reviewed & sent with pt  Follow Up Appointments: arranged; information given  Belongings: All sent with pt  IV: out  Telemetry: off  Pt exhibits understanding of above discharge instructions; all questions answered.    Discharge Paperwork: Signed, copied, and sent home with patient.

## 2021-12-18 NOTE — PLAN OF CARE
D: Admitted 12/16 for NSTEMI, s/p stent x3 on 12/17.  History of borderline HTN.     I: Monitored vitals and assessed patient status.   PRN: chloraseptic spray x2     A: A&Ox4. On room air. SR 70's-80's, VSS, afebrile. Right radial site WNL, no bleeding or hematoma, CMS intact. Tongue bruised and swollen. Urinating adequately. LBM 12/17. Denies nausea. Up independently.     P: Continue to monitor. Discharge home today.

## 2021-12-18 NOTE — PLAN OF CARE
Occupational Therapy Discharge Summary    Reason for therapy discharge:    All goals and outcomes met, no further needs identified.    Progress towards therapy goal(s). See goals on Care Plan in [facility] electronic health record for goal details.  Goals met    Therapy recommendation(s):    Continued therapy is recommended.  Rationale/Recommendations:  Recommend OP CR Phase II.

## 2021-12-18 NOTE — PLAN OF CARE
D: Admitted 12/16 for chest pain, pt had NSTEMI  Hx: Borderline HTN    I: Monitored vitals and assessed pt status.   PRN: Zofran x1 for vomiting  Hydralazine x1 for SBP>150    A: A0x4. VSS, on RA. NSR. Afebrile. TR band on R radial site. Attempted to remove air around 1730, site leaked so added 2mL of air back into the TR band. CMS intact. Pt denies chest pain and SOB. Pt ambulated to bathroom and voided x1. Low sat fat <2400mg Na+ diet. Pt vomited after dinner-see provider notification note. Pt's tongue was actively bleeding and is swollen (left side of tongue changed from bright red to purple)-team aware. R PIV SL.    P: Continue to monitor Pt status and report changes to CSI team.

## 2021-12-18 NOTE — DISCHARGE SUMMARY
[facility]   Cardiology Discharge Summary    Date of Admission:  12/16/2021  Date of Discharge:  12/18/2021   Discharging Provider: Amirah Mitchell  Date of Service: 12/18/2021     Primary Care     No Ref-Primary, Physician  No address on file      Identification and Chief Complaint: Thang Cagle is a 56 year old male who presented on 12/16/2021 with complaint of chest pain.    Discharge Diagnoses     Chest pain, unspecified type    Essential hypertension    Hyperlipidemia    Discharge Disposition   Discharged to home    Discharge Orders      Follow-Up with Cardiac Advanced Practice Provider      CARDIAC REHAB REFERRAL      Medication Instructions - Anticoagulants    Do NOT stop your aspirin or platelet inhibitor unless directed by your Cardiologist.  These medications help to prevent platelets in your blood from sticking together and forming a clot.  Examples of these medications are:  Ticagrelor (Brilinta), Clopidigrel (Plavix), Prasugrel (Effient)     When to call - Contact the Heart Clinic    You may experience symptoms that require follow-up before your scheduled appointment. Contact the Heart Clinic if you develop: Fever over 100.4o Fahrenheit, that lasts more than one day; Redness, heat, or pus at the puncture site; Change in color or temperature in your hand or arm.     When to call - Reasons to Call 911    If your wrist puncture site starts bleeding after discharge, sit down and apply firm pressure with your thumb against the puncture site and fingers against the back of the wrist for 10 minutes. If the bleeding stops, continue to rest, keeping your wrist still for 2 hours. Notify your doctor as soon as possible.  IF BLEEDING DOES NOT STOP OR THERE IS A LARGER AMOUNT OF BLEEDING OR SPURTING CALL 9-1-1 immediately.DO NOT drive yourself to the hospital.     Precautions - Lifting    DO NOT lift more than 5 pounds with affected arm for 48 hours     Precautions -  Household Activities    Avoid excessive bending or movement of your wrist for 72 hours.  Do not subject hand/arm to any forceful movements for 24 hours, such as supporting weight when rising from a chair or bed.     Remove the band-aid on the puncture site after 24 hours and leave open to air. If minor oozing, you may apply a band-aid and remove after 12 hours.     Precautions - Active Sports Activities    DO NOT engage in vigorous exercise using your affected arm for 3 days after discharge.  This includes golf, tennis or swimming.     Precautions - Operating yard equipment or vehicles    Do not operate a chainsaw, lawnmower, motorcycle, or all-terrain vehicle for 48 hours after the procedure.     Precautions - Elective Dental Work    NO elective dental work for 6 weeks after receiving a stent.     Comfort and Pain Management - Bruising after Surgery    Expect mild tingling of hand and tenderness at the wrist puncture site for up to 3 days. You may take Tylenol or a pain medicine recommended by your doctor.     Activity - Cardiac Rehab    You are encouraged to enroll in an Outpatient Cardiac Rehab program after discharge from the hospital.  Our Cardiac Rehab staff may visit briefly with you while you're in the hospital.  If they miss you, someone will contact you after you are home.     Return to Driving    Driving is NOT permitted for 24 hours after surgery     Return to work    You may return to work after 72 hours if you are feeling well and your job does not involve heavy lifting.     Shower / Bathing    You may shower on the day after your procedure.  DO NOT soak of wrist with the puncture site in water for 3 days to prevent infection. DO NOT take a tub bath or wash dishes for 3 days after the procedure     Dressing Removal    Remove the band-aid on the puncture site after 24 hours and leave open to air. If minor oozing, you may apply a band-aid and remove after 12 hours     Reason for your hospital stay     Myocardial infarction     Activity    Your activity upon discharge: activity as tolerated     Adult [facility] Follow-up and recommended labs and tests    Cardiology - 1 to 2 weeks with [facility]. 4-6 weeks with MD  PCP - 1 week    Appointments on [place] and/or [facility] (with [facility] or [facility] provider or service). Call [phone] if you haven't heard regarding these appointments within 7 days of discharge.     Diet    Follow this diet upon discharge: Orders Placed This Encounter      Low Saturated Fat Na <2400 mg     Case Request Cath Lab: Coronary Angiogram with possible intervention     Discharge Medications   Current Discharge Medication List      START taking these medications    Details   aspirin (ASA) 81 MG chewable tablet Take 1 tablet (81 mg) by mouth daily Starting tomorrow.  Qty: 30 tablet, Refills: 3    Associated Diagnoses: Coronary artery disease of native artery of native heart with stable angina pectoris (H)      atorvastatin (LIPITOR) 40 MG tablet Take 1 tablet (40 mg) by mouth daily  Qty: 90 tablet, Refills: 3    Associated Diagnoses: Coronary artery disease of native artery of native heart with stable angina pectoris (H)      carvedilol (COREG) 6.25 MG tablet Take 1 tablet (6.25 mg) by mouth 2 times daily  Qty: 180 tablet, Refills: 1    Associated Diagnoses: Coronary artery disease of native artery of native heart with stable angina pectoris (H)      lisinopril (ZESTRIL) 5 MG tablet Take 1 tablet (5 mg) by mouth daily  Qty: 90 tablet, Refills: 1    Associated Diagnoses: Coronary artery disease of native artery of native heart with stable angina pectoris (H)      nitroGLYcerin (NITROSTAT) 0.4 MG sublingual tablet For chest pain place 1 tablet under the tongue every 5 minutes for 3 doses. If symptoms persist 5 minutes after 1st dose call 911.  Qty: 25 tablet, Refills: 1    Associated Diagnoses: Coronary artery disease of native artery of native heart with stable angina pectoris (H)      ticagrelor  (BRILINTA) 90 MG tablet Take 1 tablet (90 mg) by mouth 2 times daily Dose to start tomorrow morning.  Qty: 180 tablet, Refills: 3    Associated Diagnoses: Coronary artery disease of native artery of native heart with stable angina pectoris (H)         STOP taking these medications       aspirin 81 MG EC tablet Comments:   Reason for Stopping:             Allergies   No Known Allergies    Consultations This Hospital Stay  PHARMACY IP CONSULT  NUTRITION SERVICES ADULT IP CONSULT  CARDIAC REHAB IP CONSULT  SMOKING CESSATION PROGRAM IP CONSULT    Significant Results and Procedures   Coronary Angiogram 12/17/2021:  NSTEMI  Two vessel severe CAD involving the mid LAD and mid to distal RCA with the mid RCA lesion culprit in MI. Otherwise, mild non obstructive CAD elsewhere.  PCI with two drug eluting stents to the RCA and one drug eluting stent to the mid LAD.  Ventricular fibrillation x1 defibrillation.    Echocardiogram 12/17/2021:  Global and regional left ventricular function is normal with an EF of 60-65%.  Global right ventricular function is normal.  No significant valvular abnormalities present.  The inferior vena cava was normal in size with preserved respiratory  variability.  No pericardial effusion is present.    History of Present Illness   (From H&P)   Thang Cagle is a 56 year old male who presents with intermittent chest pain for the past few weeks.  The patient works as a  and states he has noted more frequent chest pain with walking and when he is running around work or going upstairs.  This pain can last throughout his time of activity.  It gets better with rest.  It is located in the substernal area of his chest.  It is not associated with radiation, shortness of breath, diaphoresis, nausea or vomiting.  This has worsened over the past 2 to 3 weeks.  He had shingles about 15 years ago and he has ongoing aching pain from this.  He initially thought it may be shingles but now feels like this  kind of pain is different.  He does have borderline hypertension and states his blood pressure is usually in the 140s over 90s and he checks it daily at home.  He takes a baby aspirin a day and also takes many vitamins including every 10 vitamins.  He does note he has had 2 Covid shots.  He has a strong family history of heart disease.  He himself has not been to the doctor since his hernia surgery many years ago. The patient states he used to be quite active and used to bike to work.      In the ED, patient is hemodynamically stable. SBP > 150. EKG shows NSR with no acute changes. Initial HS troponin 30, repeat troponin 2-hours later 57, third recheck > 12 hours after 2nd was 124 prompting admission for NSTEMI. Denies chest pain, SOB, dizziness, n/v, or palpitations.       Hospital Course   Thang Cagle was admitted on 12/16/2021.  The following problems were addressed during his hospitalization:    NOTE: on day of discharge, a lengthy discussion was had with both the patient and his significant other regarding desire to monitor for at least 24 hours s/p revascularization and episode of VF requiring shock.  Discussed that it was our team's desire to watch him for AT LEAST 24 hour on telemetry monitoring. Conveyed that patient is at highest risk for malignant arrhythmia (VT or VF) 24 hours after revascularization.  Patient and his partner were adamant that he be discharged ASAP given other family members in the hospital. Patient understands risk of VT, VF, MI and death and still desires to be discharged.    # NSTEMI  # HTN  # HLD  Presented with intermittent exertional chest pain for the last few months, more frequent in the last few weeks. Pain was relieved by rest. Denied radiation or associated SOB, dizziness, diaphoresis, n/v, or palpitations. EKG with no acute ST or T wave changes. Troponin elevation consistent with NSTEMI. -170's. Loaded with 324 mg ASA in ED. Risk factors included family  "history and HTN. Underwent coronary angiogram which showed 2v CAD; he underwent PCI to this mLAD and m->dRCA. Culprit lesion was the RCA lesion. Case was complicated by VF requiring shock x1.  No CPR.  He was started on goal directed medical therapy.  He was monitored on telemetry overnight and had no additional VT/VF.  He will discharge to home on below GDMT.  He will follow up with cards [facility] in 1-2 weeks and cards MD in 4-6 weeks.  He will be given a referral for cardiac rehab.  - DAPT: 81mg ASA, 90mg Brilinta BID  - Statin: 40mg Lipitor daily  - BB: 6.25mg coreg BID  - ACE: 5mg lisinopril daily    # Non-cardiac chest pain secondary to hx of shingles  Noted on day of discharge.  Per patient, chest pain which brought him in to this hospital has resolved.  Shingles pain has been present for a period of 15 years and is unchanged from baseline.    # Tongue Laceration  Noted s/p angiogram.  Per patient, recalls \"biting\" his tongue when shocked for VF in the cath lab.  Difficulty eating/swallowing immediately after cath, but improved on day of discharge.  Discussed via phone with ENT - they recommended sending him home with peridex BID x7 days.      Pending Results   Unresulted Labs Ordered in the Past 30 Days of this Admission     Date and Time Order Name Status Description    12/17/2021 11:42 AM Lipid panel reflex to direct LDL In process           Physical Exam   Temp:  [97.6  F (36.4  C)-98.4  F (36.9  C)] 98.4  F (36.9  C)  Pulse:  [60-94] 80  Resp:  [12-18] 16  BP: ()/() 114/72  SpO2:  [93 %-100 %] 97 %    Constitutional: alert and oriented x4  CV: Regular rate/rhythm. No murmur, rub, gallop  Respiratory: CTA bilaterally  GI: Soft, nontender  Skin: Warm and dry  Right Wrist: angiogram access site clean, dry, intact    The discharge plan was discussed with the patient and patient agrees to plan    Total time on this discharge was greater than 30 minutes.  Discharge discussed with Dr. carias " sayra.    Amirah Mitchell PA-C  Cardiology - [facility]

## 2021-12-18 NOTE — PROGRESS NOTES
12/18/21 0800   Quick Adds   Type of Visit Initial Occupational Therapy Evaluation       Present no   Language english   Living Environment   People in home significant other   Current Living Arrangements house   Home Accessibility no concerns   Transportation Anticipated car, drives self   Living Environment Comments Pt lives in a house with his girlfriend. All needs at met on the main level   Self-Care   Usual Activity Tolerance good   Current Activity Tolerance good   Equipment Currently Used at Home none   Activity/Exercise/Self-Care Comment Pt was previously independent with ADL completion   Instrumental Activities of Daily Living (IADL)   Previous Responsibilities meal prep;housekeeping;laundry;shopping;yardwork;medication management;finances;driving;work   IADL Comments Pt and signficiant other share IADL responsibilities   Disability/Function   Hearing Difficulty or Deaf no   Wear Glasses or Blind no   Concentrating, Remembering or Making Decisions Difficulty no   Difficulty Communicating no   Difficulty Eating/Swallowing no   Walking or Climbing Stairs Difficulty no   Dressing/Bathing Difficulty no   Toileting issues no   Doing Errands Independently Difficulty (such as shopping) no   Fall history within last six months no   Change in Functional Status Since Onset of Current Illness/Injury yes   General Information   Onset of Illness/Injury or Date of Surgery 12/17/21   Referring Physician eGnoveva Murray CNP   Patient/Family Therapy Goal Statement (OT) To return home   Additional Occupational Profile Info/Pertinent History of Current Problem Thang Cagle is a pleasant 56 y.o male with no past medical history who is being admitted for NSTEMI.    Existing Precautions/Restrictions cardiac   Left Upper Extremity (Weight-bearing Status) full weight-bearing (FWB)   Right Upper Extremity (Weight-bearing Status) full weight-bearing (FWB)   Left Lower Extremity (Weight-bearing  Status) full weight-bearing (FWB)   Right Lower Extremity (Weight-bearing Status) full weight-bearing (FWB)   Heart Disease Risk Factors Family history   General Observations and Info Activity: up with assist   Cognitive Status Examination   Orientation Status orientation to person, place and time   Affect/Mental Status (Cognitive) WNL   Follows Commands WNL   Cognitive Status Comments Pt is alert, oriented and appropriate in conversation   Visual Perception   Visual Impairment/Limitations WNL   Sensory   Sensory Quick Adds No deficits were identified   Pain Assessment   Patient Currently in Pain No   Integumentary/Edema   Integumentary/Edema no deficits were identifed   Range of Motion Comprehensive   General Range of Motion no range of motion deficits identified   Comment, General Range of Motion BUE ROM WFL   Strength Comprehensive (MMT)   General Manual Muscle Testing (MMT) Assessment no strength deficits identified   Comment, General Manual Muscle Testing (MMT) Assessment BUE 5/5   Bed Mobility   Bed Mobility supine-sit   Supine-Sit [place] (Bed Mobility) independent   Transfers   Transfers sit-stand transfer   Sit-Stand Transfer   Sit-Stand [place] (Transfers) independent   Clinical Impression   Criteria for Skilled Therapeutic Interventions Met (OT) yes;meets criteria;skilled treatment is necessary   OT Diagnosis decreased activity tolerance and independence with IADL completion   OT Problem List-Impairments impacting ADL problems related to;activity tolerance impaired;strength   Assessment of Occupational Performance 1-3 Performance Deficits   Identified Performance Deficits functional mobility and IADL completion   Planned Therapy Interventions (OT) IADL retraining;strengthening;home program guidelines;progressive activity/exercise   Clinical Decision Making Complexity (OT) low complexity   Therapy Frequency (OT) 1x eval and treat   Predicted Duration of Therapy 1 day   Anticipated Equipment Needs  Upon Discharge (OT) other (see comments)  (TBD)   Risk & Benefits of therapy have been explained evaluation/treatment results reviewed;care plan/treatment goals reviewed;risks/benefits reviewed;current/potential barriers reviewed;participants voiced agreement with care plan;participants included;patient   OT Discharge Planning    OT Discharge Recommendation (DC Rec) home with outpatient cardiac rehab   OT Rationale for DC Rec Pt is independent with ADL completion and functional mobility, however would benefit from OP CR Phase II   OT Brief overview of current status  Independent   Total Evaluation Time (Minutes)   Total Evaluation Time (Minutes) 5

## 2021-12-18 NOTE — DISCHARGE INSTRUCTIONS
Changes to medication:  1. Please continue to take 81mg aspirin daily  2. Please take 90mg brilinta twice daily  3. Please start 40mg lipitor daily  4. We have started 2 medications for your heart and for high blood pressure.  We would like you to take 6.25mg coreg twice daily and 5mg lisinopril once daily.  5. I have given you a prescription for PRN nitroglycerin.  This should be taken ONLY if you have your non-shingles chest pain.    Going Home after Coronary Angioplasty or Stent Placement  FOR 24 HOURS:         Have an adult stay with you for 24 hours.         Relax and take it easy.         Drink plenty of fluids.         Do NOT make any important or legal decisions.         Do NOT drive or operate machines at home or at work.         Do NOT drink alcohol.     PROCEDURE SITE:  Care of wrist or arm site:         It is normal to have soreness at the puncture site and mild tingling in your hand for up to 3 days.         Remove the Band-Aid after 24 hours. If there is minor oozing, apply another Band-aid and remove it after 12 hours.          Do NOT take a bath, or use a hot tub or pool for the next 48 hours. You may shower.          It is normal to have a small bruise.  There should not be a lump at the site.         Do not scrub the site.         Do not use lotion or powder near the puncture site for 3 days.         For 2 days, do not use your hand or arm to support your weight (such as rising from a chair) or bend your wrist (such as lifting a garage door).         For 2 days, do not lift more than 5 pounds or exercise your arm (tennis, golf or bowling).    If you start bleeding from the site in your arm: Sit down and press firmly on the site with your fingers for 10 minutes. Call your doctor as soon as you can.    Call 911 right away if you have bleeding that is heavy or does not stop.      MEDICATIONS:   1. You have begun Brilinta (ticagrelor), do not stop taking it until you talk to your heart doctor  (cardiologist). This medication is essential for your stents. Do not stop it without speaking first to your heart doctor or their nurse- this includes if you have concerns about side effects or cost. Also, if another health provider tells you to stop it, let them know they need to discuss it with your heart doctor.   3. If you have not been continued on other medications you were previously taking at home it is probably for reason though please discuss your concerns with any of your doctors.     CARDIAC REHAB:  After the initial healing process of the procedure site, we recommend cardiac rehabilitation for all heart attack and stent patients. Cardiac rehabilitation will help you:  - Rebuild stamina, strength and balance.  - Learn how to participate in activities safely, as well as help you regain confidence to do so.  - Return to activities of daily living and leisure.  You should receive a call from them within the next week, but if you do not or you would like to call you can contact their central scheduling at [phone]    DIET:  We recommend a diet low in saturated fat, trans fat and cholesterol. In addition it will be helpful to be cautious of sodium intake, sugar and carbohydrates. Try to increase the amount of lean meats you eat like fish and chicken, but avoid frying; and reduce the amount of red meat you eat. Eat more fresh fruits and vegetables and try to avoid canned and processed food. Please reference the handouts you received for more specific information.    OTHER INFORMATION:  1. Consider having your family members learn CPR if they do not know it already.  2. If you are a smoker, quitting smoking will be one of the most important things you can do for yourself. There are nicotine replacement options or medications they might be able to be prescribed. Please discuss this with your doctors. Consider calling the [employer] at [phone] ([phone]) as they can offer ongoing support after  discharge.    CALL YOUR DOCTOR IF:  -You have a large or growing lump/bump around the procedure site  -The site is red, swollen, hot, tender or has drainage  -You have hives, a rash or unusual itching  -You have increasing or worsening shortness of breath or chest pain    FOLLOW UP:  We prefer you to follow up with your primary care provider within one week. You will be arranged to see the cardiologist (heart doctor) in clinic in about one month.     Should you need to contact us:  Cardiology clinic for scheduling or triage nurse questions/concerns:  [phone]

## 2021-12-18 NOTE — PROVIDER NOTIFICATION
Contacted [facility] cross-cover about pt projectile vomiting (x1) and tongue actively bleeding. Pt's HR dropped in the 50s during emesis episode but went back to 70s-80s post emesis. Vomit was red and had undigested food. Pt reported that tongue had been bleeding since post-cath lab. Gave PRN zofran and monitored vital signs.    Pt's BP dropped post emesis episode (lowest MAP was 63). Pt reported feeling dizzy and lightheaded during this time.

## 2021-12-19 LAB
CHOLEST SERPL-MCNC: 264 MG/DL
HDLC SERPL-MCNC: 57 MG/DL
LDLC SERPL CALC-MCNC: 186 MG/DL
NONHDLC SERPL-MCNC: 207 MG/DL
TRIGL SERPL-MCNC: 104 MG/DL

## 2021-12-20 LAB
ATRIAL RATE - MUSE: 75 BPM
ATRIAL RATE - MUSE: 84 BPM
DIASTOLIC BLOOD PRESSURE - MUSE: NORMAL MMHG
DIASTOLIC BLOOD PRESSURE - MUSE: NORMAL MMHG
INTERPRETATION ECG - MUSE: NORMAL
INTERPRETATION ECG - MUSE: NORMAL
P AXIS - MUSE: 54 DEGREES
P AXIS - MUSE: 70 DEGREES
PR INTERVAL - MUSE: 162 MS
PR INTERVAL - MUSE: 162 MS
QRS DURATION - MUSE: 70 MS
QRS DURATION - MUSE: 76 MS
QT - MUSE: 372 MS
QT - MUSE: 410 MS
QTC - MUSE: 439 MS
QTC - MUSE: 457 MS
R AXIS - MUSE: -10 DEGREES
R AXIS - MUSE: 14 DEGREES
SYSTOLIC BLOOD PRESSURE - MUSE: NORMAL MMHG
SYSTOLIC BLOOD PRESSURE - MUSE: NORMAL MMHG
T AXIS - MUSE: 1 DEGREES
T AXIS - MUSE: 35 DEGREES
VENTRICULAR RATE- MUSE: 75 BPM
VENTRICULAR RATE- MUSE: 84 BPM

## 2021-12-24 ENCOUNTER — TELEPHONE (OUTPATIENT)
Dept: CARDIOLOGY | Facility: CLINIC | Age: 56
End: 2021-12-24
Payer: COMMERCIAL

## 2021-12-24 NOTE — TELEPHONE ENCOUNTER
Discharge follow up post PCI: MICKY to LAD and RCA 12/17/2021    What does the site look like?  Review: Care of wrist It is normal to have soreness and or bruising at the puncture site and mild tingling in your hand for up to 3 days. The site should be flat and dry.   Wrist    For 2 days, do not use your hand or arm to support your weight (such as rising from a chair) or bend your wrist (such as lifting a garage door).         For 2 days, do not lift more than 5 pounds or exercise your arm (tennis, golf                   or bowling).  Healing nicely, flat and dry     Are you having any pain? Shingles, noticed that his heartburn is no longer a problem    How is your activity tolerance?    For 2 days, do NOT have sex or do any heavy exercise.  Do you have someone at home to assist you with your daily activities?  Lives with girlfriend, reviewed restrictions     Review Medications: Dual antiplatelet therapy  If you have started taking brilinta do not stop taking it until you talk to your heart doctor (cardiologist). Dual antiplatelet therapy  If you have stopped any other medicines, check with your nurse or provider about when to restart them.  Review Nitroglycerin instructions, take one tablet under the tongue for chest pain, if there is no relief take another one 5 minutes apart. If you still have no relief from the chest pain, call 911.  New meds at discharge, coreg, peridex, zestril, ASA and lipitor.  New Medication: Patient was educated regarding newly prescribed medication, including discussion of  the indication, administration, side effects, and when to report to MD or RN. Patient demonstrated understanding of this information and agreed to call with further questions or concerns.    Have you scheduled with Cardiac Rehab? yes     FOLLOW UP  Do you have a follow up appointment with your provider?  no    Are you to get labs, procedures or tests before you see your provider? no      You are scheduled to see   Alex on Friday January 7 at 10 am        CONTACT INFORMATION  Please feel free to call us with any other questions or symptoms that are concerning for you at [phone] if it is after 4:30 in the afternoon, or a weekend please call [phone] and ask for the on call specialist.  We want to do everything we can to help prevent you needing to return to the ED, so please do not hesitate to call us.NOT BRING ANY VALUABLES!

## 2021-12-29 ENCOUNTER — HOSPITAL ENCOUNTER (OUTPATIENT)
Dept: CARDIAC REHAB | Facility: CLINIC | Age: 56
End: 2021-12-29
Attending: NURSE PRACTITIONER
Payer: COMMERCIAL

## 2021-12-29 DIAGNOSIS — I25.118 CORONARY ARTERY DISEASE OF NATIVE ARTERY OF NATIVE HEART WITH STABLE ANGINA PECTORIS (H): ICD-10-CM

## 2021-12-29 PROCEDURE — 93798 PHYS/QHP OP CAR RHAB W/ECG: CPT

## 2021-12-29 PROCEDURE — 93797 PHYS/QHP OP CAR RHAB WO ECG: CPT | Mod: XU

## 2022-01-04 ENCOUNTER — HOSPITAL ENCOUNTER (OUTPATIENT)
Dept: CARDIAC REHAB | Facility: CLINIC | Age: 57
End: 2022-01-04
Attending: NURSE PRACTITIONER
Payer: COMMERCIAL

## 2022-01-04 PROCEDURE — 93798 PHYS/QHP OP CAR RHAB W/ECG: CPT

## 2022-01-21 ENCOUNTER — OFFICE VISIT (OUTPATIENT)
Dept: CARDIOLOGY | Facility: CLINIC | Age: 57
End: 2022-01-21
Attending: NURSE PRACTITIONER
Payer: COMMERCIAL

## 2022-01-21 VITALS
OXYGEN SATURATION: 96 % | HEART RATE: 82 BPM | DIASTOLIC BLOOD PRESSURE: 78 MMHG | BODY MASS INDEX: 30.62 KG/M2 | WEIGHT: 213.9 LBS | SYSTOLIC BLOOD PRESSURE: 122 MMHG | HEIGHT: 70 IN

## 2022-01-21 DIAGNOSIS — I25.118 CORONARY ARTERY DISEASE OF NATIVE ARTERY OF NATIVE HEART WITH STABLE ANGINA PECTORIS (H): ICD-10-CM

## 2022-01-21 PROCEDURE — 99205 OFFICE O/P NEW HI 60 MIN: CPT | Performed by: STUDENT IN AN ORGANIZED HEALTH CARE EDUCATION/TRAINING PROGRAM

## 2022-01-21 PROCEDURE — G0463 HOSPITAL OUTPT CLINIC VISIT: HCPCS

## 2022-01-21 RX ORDER — ATORVASTATIN CALCIUM 80 MG/1
80 TABLET, FILM COATED ORAL DAILY
Qty: 90 TABLET | Refills: 3 | Status: SHIPPED | OUTPATIENT
Start: 2022-01-21 | End: 2023-02-15

## 2022-01-21 ASSESSMENT — MIFFLIN-ST. JEOR: SCORE: 1817.74

## 2022-01-21 ASSESSMENT — PAIN SCALES - GENERAL: PAINLEVEL: NO PAIN (0)

## 2022-01-21 NOTE — LETTER
Date:January 25, 2022      Patient was self referred, no letter generated. Do not send.        [facility] Health Information

## 2022-01-21 NOTE — PATIENT INSTRUCTIONS
"January 21, 2022    Cardiology Provider You Saw During Your Visit: Dr. Johnson      Medication Changes:   -Increase Lipitor to 80 mg daily      Follow Up:   -Check labs in 2-3 months   -Follow up with Dr. Johnson in 1 year      Follow the American Heart Association Diet and Lifestyle Recommendations:  -Limit saturated fat, trans fat, sodium, red meat, sweets and sugar-sweetened beverages. If you choose to eat red meat, compare labels and select the leanest cuts available.  -Aim for at least 150 minutes of moderate physical activity or 75 minutes of vigorous physical activity - or an equal combination of both - each week.      To Reach Us:  -During business hours: [phone], press option # 1 to be routed to the [facility] then option # 4 for \"To send a message to your care team\"     -After hours, weekends or holidays: [phone], press option #4 and ask to speak to the on-call cardiologist. Inform them you are a patient at the [facility].      Elba Farmer RN  Cardiology Care Coordinator - General Cardiology  [facility] [place]    "

## 2022-01-21 NOTE — NURSING NOTE
Chief Complaint   Patient presents with     Follow Up     [place] pt, F/U post coronary angio with placement of stents.   Vitals were taken and medications reconciled.    Sander Fernando, EMT  9:58 AM

## 2022-01-21 NOTE — NURSING NOTE
January 21, 2022    Medication Changes:   -Increase Lipitor to 80 mg daily    Follow Up:   -Check labs in 2-3 months   -Follow up with Dr. Johnson in 1 year    Patient verbalized understanding of all health information given and agreed to call with further questions or concerns.      Elba Farmer RN

## 2022-01-21 NOTE — PROGRESS NOTES
Chief complaint: Post hospital follow up.     HPI:   Thang Cagle is a 56 year old male with history of coronary artery disease status post NSTEMI f with two-vessel coronary artery disease 2v CAD with RCA culprit s/p PCI to his LAD and RCA who presents for follow-up.     Patient reports that prior to the presentation he was feeling heartburn-like symptoms and those have progressed and led him to be evaluated at that time when he was found to have a NSTEMI.   Patient reports that since that procedure he is doing well, denies chest pain he is active without limitations. He eats well and trying to eat healthy foods.     He denies any chest pain, dyspnea at rest or with exertion, PND, orthopnea, peripheral edema, palpitations, lightheadedness or syncope.       PAST MEDICAL HISTORY:  No past medical history on file.    CURRENT MEDICATIONS:  Current Outpatient Medications   Medication Sig Dispense Refill     aspirin (ASA) 81 MG chewable tablet Take 1 tablet (81 mg) by mouth daily Starting tomorrow. 30 tablet 3     atorvastatin (LIPITOR) 80 MG tablet Take 1 tablet (80 mg) by mouth daily 90 tablet 3     carvedilol (COREG) 6.25 MG tablet Take 1 tablet (6.25 mg) by mouth 2 times daily 180 tablet 1     chlorhexidine (PERIDEX) 0.12 % solution Swish and spit 15 mLs in mouth 2 times daily 473 mL 0     lisinopril (ZESTRIL) 5 MG tablet Take 1 tablet (5 mg) by mouth daily 90 tablet 1     nitroGLYcerin (NITROSTAT) 0.4 MG sublingual tablet For chest pain place 1 tablet under the tongue every 5 minutes for 3 doses. If symptoms persist 5 minutes after 1st dose call 911. 25 tablet 1     ticagrelor (BRILINTA) 90 MG tablet Take 1 tablet (90 mg) by mouth 2 times daily Dose to start tomorrow morning. 180 tablet 3       PAST SURGICAL HISTORY:  Past Surgical History:   Procedure Laterality Date     CV HEART CATHETERIZATION WITH POSSIBLE INTERVENTION N/A 12/17/2021    Procedure: Heart Catheterization with Possible Intervention;   "Surgeon: Cade Nicholson MD;  Location:  HEART CARDIAC CATH LAB     CV PCI ANGIOPLASTY N/A 12/17/2021    Procedure: Percutaneous Transluminal Angioplasty;  Surgeon: Cade Nicholosn MD;  Location:  HEART CARDIAC CATH LAB       ALLERGIES:   No Known Allergies    FAMILY HISTORY:  Mother with premature CAD in her 50s.     SOCIAL HISTORY:  Social History     Tobacco Use     Smoking status: Never Smoker     Smokeless tobacco: Never Used   Substance Use Topics     Alcohol use: Yes     Comment: 3-4 wk     Drug use: No       ROS:   A comprehensive 14 point review of systems is negative other than as mentioned in HPI.    Exam:  /78 (BP Location: Right arm, Patient Position: Sitting, Cuff Size: Adult Regular)   Pulse 82   Ht 1.788 m (5' 10.39\")   Wt 97 kg (213 lb 14.4 oz)   SpO2 96%   BMI 30.35 kg/m    GENERAL APPEARANCE: healthy, alert and no distress  EYES: no icterus, no xanthelasmas  ENT: normal palate, mucosa moist, no central cyanosis  NECK: JVP not elevated  RESPIRATORY: lungs clear to auscultation - no rales, rhonchi or wheezes, no use of accessory muscles, no retractions, respirations are unlabored, normal respiratory rate  CARDIOVASCULAR: regular rhythm, normal S1 with physiologic split S2, no S3 or S4 and no murmur, click or rub.  GI: soft, non tender, bowel sounds normal,no abdominal bruits  EXTREMITIES: no edema, no bruits  NEURO: alert and oriented to person/place/time, normal speech, gait and affect  VASC: Radial, dorsalis pedis and posterior tibialis pulses 2+ bilaterally.  SKIN: no ecchymoses, no rashes.  PSYCH: cooperative, affect appropriate.     Labs:  Reviewed.   LDL Cholesterol Calculated   Date Value Ref Range Status   12/18/2021 186 (H) <=100 mg/dL Final       Testing/Procedures:    Coronary Angiogram 12/17/2021:  NSTEMI  Two vessel severe CAD involving the mid LAD and mid to distal RCA with the mid RCA lesion culprit in MI. Otherwise, mild non obstructive " CAD elsewhere.  PCI with two drug eluting stents to the RCA and one drug eluting stent to the mid LAD.  Ventricular fibrillation x1 defibrillation.     Echocardiogram 12/17/2021:  Global and regional left ventricular function is normal with an EF of 60-65%.  Global right ventricular function is normal.  No significant valvular abnormalities present.  The inferior vena cava was normal in size with preserved respiratory  variability.  No pericardial effusion is present.    Assessment and Plan:   Thang Cagle is a 56 year old male with history of coronary artery disease status post NSTEMI f with two-vessel coronary artery disease 2v CAD with RCA culprit s/p PCI to his LAD and RCA who presents for follow-up.     Coronary artery disease status post NSTEMI with two vessel severe CAD involving the mid LAD and mid to distal RCA with the mid RCA lesion culprit in MI, status post PCI to the RCA and mid LAD.   -Continue aspirin and Brilinta  -Increase atorvastatin dose from 40 mg to 80 mg (repeat lipid panel in 3 months).   -Continue carvedilol 6.25 mg twice daily and lisinopril 5 mg daily.      Chart review time: 30 minutes  Visit time: 30 minutes  Total time spent: 60 minutes  >50% of this 30-minute visit were spent with the patient on in-person counseling and discussion regarding coronary artery disease and NSTEMI.     The patient states understanding and is agreeable with plan.     Carlos Johnson MD  Cardiology    CC  SELF, REFERRED

## 2022-01-21 NOTE — LETTER
1/21/2022      RE: Thang Cagle  [address]  [facility] [address]       Dear Colleague,    Thank you for the opportunity to participate in the care of your patient, Thang Cagle, at the [facility] [place] [facility]. Please see a copy of my visit note below.    Chief complaint: Post hospital follow up.     HPI:   Thang Cagle is a 56 year old male with history of coronary artery disease status post NSTEMI f with two-vessel coronary artery disease 2v CAD with RCA culprit s/p PCI to his LAD and RCA who presents for follow-up.     Patient reports that prior to the presentation he was feeling heartburn-like symptoms and those have progressed and led him to be evaluated at that time when he was found to have a NSTEMI.   Patient reports that since that procedure he is doing well, denies chest pain he is active without limitations. He eats well and trying to eat healthy foods.     He denies any chest pain, dyspnea at rest or with exertion, PND, orthopnea, peripheral edema, palpitations, lightheadedness or syncope.       PAST MEDICAL HISTORY:  No past medical history on file.    CURRENT MEDICATIONS:  Current Outpatient Medications   Medication Sig Dispense Refill     aspirin (ASA) 81 MG chewable tablet Take 1 tablet (81 mg) by mouth daily Starting tomorrow. 30 tablet 3     atorvastatin (LIPITOR) 80 MG tablet Take 1 tablet (80 mg) by mouth daily 90 tablet 3     carvedilol (COREG) 6.25 MG tablet Take 1 tablet (6.25 mg) by mouth 2 times daily 180 tablet 1     chlorhexidine (PERIDEX) 0.12 % solution Swish and spit 15 mLs in mouth 2 times daily 473 mL 0     lisinopril (ZESTRIL) 5 MG tablet Take 1 tablet (5 mg) by mouth daily 90 tablet 1     nitroGLYcerin (NITROSTAT) 0.4 MG sublingual tablet For chest pain place 1 tablet under the tongue every 5 minutes for 3 doses. If symptoms persist 5 minutes after 1st dose call [phone]. [phone]  "tablet 1     ticagrelor (BRILINTA) 90 MG tablet Take 1 tablet (90 mg) by mouth 2 times daily Dose to start tomorrow morning. 180 tablet 3       PAST SURGICAL HISTORY:  Past Surgical History:   Procedure Laterality Date     CV HEART CATHETERIZATION WITH POSSIBLE INTERVENTION N/A 12/17/2021    Procedure: Heart Catheterization with Possible Intervention;  Surgeon: Cade Nicholson MD;  Location:  HEART CARDIAC CATH LAB     CV PCI ANGIOPLASTY N/A 12/17/2021    Procedure: Percutaneous Transluminal Angioplasty;  Surgeon: Cade Nicholson MD;  Location:  HEART CARDIAC CATH LAB       ALLERGIES:   No Known Allergies    FAMILY HISTORY:  Mother with premature CAD in her 50s.     SOCIAL HISTORY:  Social History     Tobacco Use     Smoking status: Never Smoker     Smokeless tobacco: Never Used   Substance Use Topics     Alcohol use: Yes     Comment: 3-4 wk     Drug use: No       ROS:   A comprehensive 14 point review of systems is negative other than as mentioned in HPI.    Exam:  /78 (BP Location: Right arm, Patient Position: Sitting, Cuff Size: Adult Regular)   Pulse 82   Ht 1.788 m (5' 10.39\")   Wt 97 kg (213 lb 14.4 oz)   SpO2 96%   BMI 30.35 kg/m    GENERAL APPEARANCE: healthy, alert and no distress  EYES: no icterus, no xanthelasmas  ENT: normal palate, mucosa moist, no central cyanosis  NECK: JVP not elevated  RESPIRATORY: lungs clear to auscultation - no rales, rhonchi or wheezes, no use of accessory muscles, no retractions, respirations are unlabored, normal respiratory rate  CARDIOVASCULAR: regular rhythm, normal S1 with physiologic split S2, no S3 or S4 and no murmur, click or rub.  GI: soft, non tender, bowel sounds normal,no abdominal bruits  EXTREMITIES: no edema, no bruits  NEURO: alert and oriented to person/place/time, normal speech, gait and affect  VASC: Radial, dorsalis pedis and posterior tibialis pulses 2+ bilaterally.  SKIN: no ecchymoses, no rashes.  PSYCH: " cooperative, affect appropriate.     Labs:  Reviewed.   LDL Cholesterol Calculated   Date Value Ref Range Status   12/18/2021 186 (H) <=100 mg/dL Final       Testing/Procedures:    Coronary Angiogram 12/17/2021:  NSTEMI  Two vessel severe CAD involving the mid LAD and mid to distal RCA with the mid RCA lesion culprit in MI. Otherwise, mild non obstructive CAD elsewhere.  PCI with two drug eluting stents to the RCA and one drug eluting stent to the mid LAD.  Ventricular fibrillation x1 defibrillation.     Echocardiogram 12/17/2021:  Global and regional left ventricular function is normal with an EF of 60-65%.  Global right ventricular function is normal.  No significant valvular abnormalities present.  The inferior vena cava was normal in size with preserved respiratory  variability.  No pericardial effusion is present.    Assessment and Plan:   Thang Cagle is a 56 year old male with history of coronary artery disease status post NSTEMI f with two-vessel coronary artery disease 2v CAD with RCA culprit s/p PCI to his LAD and RCA who presents for follow-up.     Coronary artery disease status post NSTEMI with two vessel severe CAD involving the mid LAD and mid to distal RCA with the mid RCA lesion culprit in MI, status post PCI to the RCA and mid LAD.   -Continue aspirin and Brilinta  -Increase atorvastatin dose from 40 mg to 80 mg (repeat lipid panel in 3 months).   -Continue carvedilol 6.25 mg twice daily and lisinopril 5 mg daily.      Chart review time: 30 minutes  Visit time: 30 minutes  Total time spent: 60 minutes  >50% of this 30-minute visit were spent with the patient on in-person counseling and discussion regarding coronary artery disease and NSTEMI.     The patient states understanding and is agreeable with plan.     Carlos Johnson MD  Cardiology    CC  SELF, REFERRED          Please do not hesitate to contact me if you have any questions/concerns.     Sincerely,     Elizabeth Nayak  MD Alex

## 2022-03-09 ENCOUNTER — LAB (OUTPATIENT)
Dept: LAB | Facility: CLINIC | Age: 57
End: 2022-03-09
Attending: STUDENT IN AN ORGANIZED HEALTH CARE EDUCATION/TRAINING PROGRAM
Payer: COMMERCIAL

## 2022-03-09 DIAGNOSIS — I25.118 CORONARY ARTERY DISEASE OF NATIVE ARTERY OF NATIVE HEART WITH STABLE ANGINA PECTORIS (H): ICD-10-CM

## 2022-03-09 LAB
ALBUMIN SERPL-MCNC: 4.1 G/DL (ref 3.4–5)
ALP SERPL-CCNC: 62 U/L (ref 40–150)
ALT SERPL W P-5'-P-CCNC: 116 U/L (ref 0–70)
ANION GAP SERPL CALCULATED.3IONS-SCNC: 4 MMOL/L (ref 3–14)
AST SERPL W P-5'-P-CCNC: 53 U/L (ref 0–45)
BILIRUB SERPL-MCNC: 0.5 MG/DL (ref 0.2–1.3)
BUN SERPL-MCNC: 20 MG/DL (ref 7–30)
CALCIUM SERPL-MCNC: 9.3 MG/DL (ref 8.5–10.1)
CHLORIDE BLD-SCNC: 107 MMOL/L (ref 94–109)
CHOLEST SERPL-MCNC: 178 MG/DL
CO2 SERPL-SCNC: 29 MMOL/L (ref 20–32)
CREAT SERPL-MCNC: 1.03 MG/DL (ref 0.66–1.25)
FASTING STATUS PATIENT QL REPORTED: NORMAL
GFR SERPL CREATININE-BSD FRML MDRD: 86 ML/MIN/1.73M2
GLUCOSE BLD-MCNC: 102 MG/DL (ref 70–99)
HDLC SERPL-MCNC: 56 MG/DL
LDLC SERPL CALC-MCNC: 96 MG/DL
NONHDLC SERPL-MCNC: 122 MG/DL
POTASSIUM BLD-SCNC: 4.7 MMOL/L (ref 3.4–5.3)
PROT SERPL-MCNC: 7.8 G/DL (ref 6.8–8.8)
SODIUM SERPL-SCNC: 140 MMOL/L (ref 133–144)
TRIGL SERPL-MCNC: 128 MG/DL

## 2022-03-09 PROCEDURE — 36415 COLL VENOUS BLD VENIPUNCTURE: CPT | Performed by: PATHOLOGY

## 2022-03-09 PROCEDURE — 80053 COMPREHEN METABOLIC PANEL: CPT | Performed by: PATHOLOGY

## 2022-03-09 PROCEDURE — 80061 LIPID PANEL: CPT | Performed by: PATHOLOGY

## 2022-10-19 DIAGNOSIS — I25.118 CORONARY ARTERY DISEASE OF NATIVE ARTERY OF NATIVE HEART WITH STABLE ANGINA PECTORIS (H): ICD-10-CM

## 2022-10-19 NOTE — TELEPHONE ENCOUNTER
carvedilol (COREG) 6.25 MG tablet 180 tablet 1 12/18/2021         Last Written Prescription Date:  12-  Last Fill Quantity: 180,   # refills: 1  Last Office Visit : 1-  Future Office visit:  1-    Routing refill request to provider for review/approval because:  Large gap in therapy?      Kathleen M Doege RN

## 2022-10-20 RX ORDER — CARVEDILOL 6.25 MG/1
6.25 TABLET ORAL 2 TIMES DAILY
Qty: 180 TABLET | Refills: 0 | Status: SHIPPED | OUTPATIENT
Start: 2022-10-20 | End: 2023-02-15

## 2022-12-08 DIAGNOSIS — I25.118 CORONARY ARTERY DISEASE OF NATIVE ARTERY OF NATIVE HEART WITH STABLE ANGINA PECTORIS (H): ICD-10-CM

## 2022-12-09 NOTE — TELEPHONE ENCOUNTER
lisinopril (ZESTRIL) 5 MG tablet      Last Written Prescription Date:  12-18-21  Last Fill Quantity: 90,   # refills: 1  Per pharm fax, last disp 7-10-22, 90 tb  Last Office Visit : 1-21-22  Future Office visit:  1-20-23    Routing refill request to provider for review/approval because:  Last fill hospital discharge, gap in RF

## 2022-12-12 RX ORDER — LISINOPRIL 5 MG/1
5 TABLET ORAL DAILY
Qty: 90 TABLET | Refills: 0 | Status: SHIPPED | OUTPATIENT
Start: 2022-12-12 | End: 2023-03-31

## 2023-02-13 DIAGNOSIS — I25.118 CORONARY ARTERY DISEASE OF NATIVE ARTERY OF NATIVE HEART WITH STABLE ANGINA PECTORIS (H): ICD-10-CM

## 2023-02-15 RX ORDER — ATORVASTATIN CALCIUM 80 MG/1
80 TABLET, FILM COATED ORAL DAILY
Qty: 90 TABLET | Refills: 0 | Status: SHIPPED | OUTPATIENT
Start: 2023-02-15 | End: 2023-03-31

## 2023-02-15 RX ORDER — CARVEDILOL 6.25 MG/1
6.25 TABLET ORAL 2 TIMES DAILY
Qty: 180 TABLET | Refills: 0 | Status: SHIPPED | OUTPATIENT
Start: 2023-02-15 | End: 2023-03-31

## 2023-02-15 NOTE — TELEPHONE ENCOUNTER
Last Clinic Visit:  1/21/22  NV: NONE    RTC 1 YEAR  CARD FYI  90 DAY RF SENT   OVER DUE  RTC APPT

## 2023-02-16 ENCOUNTER — TELEPHONE (OUTPATIENT)
Dept: CARDIOLOGY | Facility: CLINIC | Age: 58
End: 2023-02-16
Payer: COMMERCIAL

## 2023-03-31 ENCOUNTER — OFFICE VISIT (OUTPATIENT)
Dept: CARDIOLOGY | Facility: CLINIC | Age: 58
End: 2023-03-31
Attending: STUDENT IN AN ORGANIZED HEALTH CARE EDUCATION/TRAINING PROGRAM
Payer: COMMERCIAL

## 2023-03-31 VITALS
SYSTOLIC BLOOD PRESSURE: 146 MMHG | HEART RATE: 79 BPM | HEIGHT: 69 IN | DIASTOLIC BLOOD PRESSURE: 100 MMHG | BODY MASS INDEX: 33.3 KG/M2 | WEIGHT: 224.8 LBS | OXYGEN SATURATION: 98 %

## 2023-03-31 DIAGNOSIS — Z00.00 PREVENTATIVE HEALTH CARE: Primary | ICD-10-CM

## 2023-03-31 DIAGNOSIS — I25.118 CORONARY ARTERY DISEASE OF NATIVE ARTERY OF NATIVE HEART WITH STABLE ANGINA PECTORIS (H): ICD-10-CM

## 2023-03-31 PROCEDURE — 99215 OFFICE O/P EST HI 40 MIN: CPT | Performed by: STUDENT IN AN ORGANIZED HEALTH CARE EDUCATION/TRAINING PROGRAM

## 2023-03-31 PROCEDURE — G0463 HOSPITAL OUTPT CLINIC VISIT: HCPCS | Performed by: STUDENT IN AN ORGANIZED HEALTH CARE EDUCATION/TRAINING PROGRAM

## 2023-03-31 RX ORDER — ATORVASTATIN CALCIUM 80 MG/1
80 TABLET, FILM COATED ORAL DAILY
Qty: 90 TABLET | Refills: 3 | Status: SHIPPED | OUTPATIENT
Start: 2023-03-31 | End: 2024-05-28

## 2023-03-31 RX ORDER — LISINOPRIL 5 MG/1
5 TABLET ORAL DAILY
Qty: 90 TABLET | Refills: 3 | Status: SHIPPED | OUTPATIENT
Start: 2023-03-31 | End: 2023-06-30

## 2023-03-31 RX ORDER — CARVEDILOL 6.25 MG/1
6.25 TABLET ORAL 2 TIMES DAILY
Qty: 180 TABLET | Refills: 3 | Status: SHIPPED | OUTPATIENT
Start: 2023-03-31 | End: 2024-06-07

## 2023-03-31 ASSESSMENT — PAIN SCALES - GENERAL: PAINLEVEL: NO PAIN (0)

## 2023-03-31 NOTE — NURSING NOTE
Chief Complaint   Patient presents with     Follow Up     Return Elizabeth duarte         Vitals were taken, medications reconciled.    Mary Rogers, EMT   11:13 AM  
March 31, 2023      Medication Changes: Stop brilinta      Follow Up:   -Fasting labs when able  -Echocardiogram when able  -Referral to primary care placed  -Follow up with Dr. Johnson in 2 months      Patient verbalized understanding of all health information given and agreed to call with further questions or concerns.      Elba Farmer RN        
1-2 cups/cans per day

## 2023-03-31 NOTE — LETTER
3/31/2023      RE: Thang Cagle  [address]  [facility] [address]       Dear Colleague,    Thank you for the opportunity to participate in the care of your patient, Thang Cagle, at the [facility] [place] [facility]. Please see a copy of my visit note below.    Chief complaint: Follow up.     HPI:   Thang Cagle is a 58 year old male with history of coronary artery disease status post NSTEMI f with two-vessel coronary artery disease 2v CAD with RCA culprit s/p PCI to his LAD and RCA who presents for follow-up.     He reports that is doing well, he is not checking his blood pressure often but will start monitoring more often.     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     PAST MEDICAL HISTORY:  No past medical history on file.    CURRENT MEDICATIONS:  Current Outpatient Medications   Medication Sig Dispense Refill     aspirin (ASA) 81 MG chewable tablet Take 1 tablet (81 mg) by mouth daily Starting tomorrow. 30 tablet 3     atorvastatin (LIPITOR) 80 MG tablet Take 1 tablet (80 mg) by mouth daily 90 tablet 0     carvedilol (COREG) 6.25 MG tablet Take 1 tablet (6.25 mg) by mouth 2 times daily 180 tablet 0     chlorhexidine (PERIDEX) 0.12 % solution Swish and spit 15 mLs in mouth 2 times daily 473 mL 0     lisinopril (ZESTRIL) 5 MG tablet Take 1 tablet (5 mg) by mouth daily 90 tablet 0     nitroGLYcerin (NITROSTAT) 0.4 MG sublingual tablet For chest pain place 1 tablet under the tongue every 5 minutes for 3 doses. If symptoms persist 5 minutes after 1st dose call 911. 25 tablet 1     ticagrelor (BRILINTA) 90 MG tablet Take 1 tablet (90 mg) by mouth 2 times daily Dose to start tomorrow morning. 180 tablet 3       PAST SURGICAL HISTORY:  Past Surgical History:   Procedure Laterality Date     CV HEART CATHETERIZATION WITH POSSIBLE INTERVENTION N/A 12/17/2021    Procedure: Heart  "Catheterization with Possible Intervention;  Surgeon: Cade Nicholson MD;  Location: [facility] CARDIAC CATH LAB     CV PCI ANGIOPLASTY N/A 12/17/2021    Procedure: Percutaneous Transluminal Angioplasty;  Surgeon: Cade Nicholson MD;  Location: [facility] CARDIAC CATH LAB       ALLERGIES:   No Known Allergies    FAMILY HISTORY:  Mother with premature CAD in her 50s.     SOCIAL HISTORY:  Social History     Tobacco Use     Smoking status: Never     Smokeless tobacco: Never   Substance Use Topics     Alcohol use: Yes     Comment: 3-4 wk     Drug use: No       ROS:   A comprehensive 14 point review of systems is negative other than as mentioned in HPI.    Exam:  BP (!) 146/100 (BP Location: Left arm, Patient Position: Sitting, Cuff Size: Adult Large)   Pulse 79   Ht 1.75 m (5' 8.9\")   Wt 102 kg (224 lb 12.8 oz)   SpO2 98%   BMI 33.30 kg/m    GENERAL APPEARANCE: healthy, alert and no distress  EYES: no icterus, no xanthelasmas  ENT: normal palate, mucosa moist, no central cyanosis  NECK: JVP not elevated  RESPIRATORY: lungs clear to auscultation - no rales, rhonchi or wheezes, no use of accessory muscles, no retractions, respirations are unlabored, normal respiratory rate  CARDIOVASCULAR: regular rhythm, normal S1 with physiologic split S2, no S3 or S4 and no murmur, click or rub.  EXTREMITIES: no edema, no bruits  Labs:  Reviewed.   LDL Cholesterol Calculated   Date Value Ref Range Status   03/09/2022 96 <=100 mg/dL Final       Testing/Procedures:    Coronary Angiogram 12/17/2021:  NSTEMI  Two vessel severe CAD involving the mid LAD and mid to distal RCA with the mid RCA lesion culprit in MI. Otherwise, mild non obstructive CAD elsewhere.  PCI with two drug eluting stents to the RCA and one drug eluting stent to the mid LAD.  Ventricular fibrillation x1 defibrillation.     Echocardiogram 12/17/2021:  Global and regional left ventricular function is normal with an EF of 60-65%.  Global " right ventricular function is normal.  No significant valvular abnormalities present.  The inferior vena cava was normal in size with preserved respiratory  variability.  No pericardial effusion is present.    Assessment and Plan:   Thang Cagle is a 58 year old male with history of coronary artery disease status post NSTEMI f with two-vessel coronary artery disease 2v CAD with RCA culprit s/p PCI to his LAD and RCA who presents for follow-up.     Coronary artery disease status post NSTEMI with two vessel severe CAD involving the mid LAD and mid to distal RCA with the mid RCA lesion culprit in MI, status post PCI to the RCA and mid LAD. Patient was not not sure about the medications he is taking.   -Continue aspirin stop Brilinta  -Atorvastatin 80 mg  -Continue carvedilol 6.25 mg twice daily and lisinopril 5 mg daily.    - Echocardiogram  - Lipid panel, will consider PCSK9 if LDL not at goal  - Referral to PCP    Chart review time:20 minutes  Visit time: 20 minutes  Total time spent: 40 minutes  >50% of this 20-minute visit were spent with the patient on in-person counseling and discussion regarding coronary artery disease.     The patient states understanding and is agreeable with plan.     Carlos Johnson MD  Cardiology    CC  SELF, REFERRED          Please do not hesitate to contact me if you have any questions/concerns.     Sincerely,     Elizabeth Johnson MD

## 2023-03-31 NOTE — PATIENT INSTRUCTIONS
March 31, 2023    Cardiology Provider You Saw During Your Visit: Dr. Johnson      Medication Changes: Stop brilinta      Follow Up:   -Fasting labs when able  -Echocardiogram when able  -Referral to primary care placed  -Follow up with Dr. Johnson in 2 months        Follow the American Heart Association Diet and Lifestyle Recommendations:  -Limit saturated fat, trans fat, sodium, red meat, sweets and sugar-sweetened beverages. If you choose to eat red meat, compare labels and select the leanest cuts available.  -Aim for at least 150 minutes of moderate physical activity or 75 minutes of vigorous physical activity - or an equal combination of both - each week.      To Reach Us:  -During business hours: [phone], press option # 1 to schedule an appointment or to leave a message for your care team.     -After hours, weekends or holidays: [phone], press option #4 and ask to speak to the on-call cardiologist. Inform them you are a patient at the [facility].      Elba Farmer RN  Cardiology Care Coordinator - General Cardiology  [facility]

## 2023-03-31 NOTE — PROGRESS NOTES
Chief complaint: Follow up.     HPI:   Thang Cagle is a 58 year old male with history of coronary artery disease status post NSTEMI f with two-vessel coronary artery disease 2v CAD with RCA culprit s/p PCI to his LAD and RCA who presents for follow-up.     He reports that is doing well, he is not checking his blood pressure often but will start monitoring more often.     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     PAST MEDICAL HISTORY:  No past medical history on file.    CURRENT MEDICATIONS:  Current Outpatient Medications   Medication Sig Dispense Refill     aspirin (ASA) 81 MG chewable tablet Take 1 tablet (81 mg) by mouth daily Starting tomorrow. 30 tablet 3     atorvastatin (LIPITOR) 80 MG tablet Take 1 tablet (80 mg) by mouth daily 90 tablet 0     carvedilol (COREG) 6.25 MG tablet Take 1 tablet (6.25 mg) by mouth 2 times daily 180 tablet 0     chlorhexidine (PERIDEX) 0.12 % solution Swish and spit 15 mLs in mouth 2 times daily 473 mL 0     lisinopril (ZESTRIL) 5 MG tablet Take 1 tablet (5 mg) by mouth daily 90 tablet 0     nitroGLYcerin (NITROSTAT) 0.4 MG sublingual tablet For chest pain place 1 tablet under the tongue every 5 minutes for 3 doses. If symptoms persist 5 minutes after 1st dose call 911. 25 tablet 1     ticagrelor (BRILINTA) 90 MG tablet Take 1 tablet (90 mg) by mouth 2 times daily Dose to start tomorrow morning. 180 tablet 3       PAST SURGICAL HISTORY:  Past Surgical History:   Procedure Laterality Date     CV HEART CATHETERIZATION WITH POSSIBLE INTERVENTION N/A 12/17/2021    Procedure: Heart Catheterization with Possible Intervention;  Surgeon: Cade Nicholson MD;  Location: [facility] CARDIAC CATH LAB     CV PCI ANGIOPLASTY N/A 12/17/2021    Procedure: Percutaneous Transluminal Angioplasty;  Surgeon: Cade Nicholson MD;  Location: [facility] CARDIAC CATH LAB       ALLERGIES:   No Known Allergies    FAMILY  "HISTORY:  Mother with premature CAD in her 50s.     SOCIAL HISTORY:  Social History     Tobacco Use     Smoking status: Never     Smokeless tobacco: Never   Substance Use Topics     Alcohol use: Yes     Comment: 3-4 wk     Drug use: No       ROS:   A comprehensive 14 point review of systems is negative other than as mentioned in HPI.    Exam:  BP (!) 146/100 (BP Location: Left arm, Patient Position: Sitting, Cuff Size: Adult Large)   Pulse 79   Ht 1.75 m (5' 8.9\")   Wt 102 kg (224 lb 12.8 oz)   SpO2 98%   BMI 33.30 kg/m    GENERAL APPEARANCE: healthy, alert and no distress  EYES: no icterus, no xanthelasmas  ENT: normal palate, mucosa moist, no central cyanosis  NECK: JVP not elevated  RESPIRATORY: lungs clear to auscultation - no rales, rhonchi or wheezes, no use of accessory muscles, no retractions, respirations are unlabored, normal respiratory rate  CARDIOVASCULAR: regular rhythm, normal S1 with physiologic split S2, no S3 or S4 and no murmur, click or rub.  EXTREMITIES: no edema, no bruits  Labs:  Reviewed.   LDL Cholesterol Calculated   Date Value Ref Range Status   03/09/2022 96 <=100 mg/dL Final       Testing/Procedures:    Coronary Angiogram 12/17/2021:  NSTEMI  Two vessel severe CAD involving the mid LAD and mid to distal RCA with the mid RCA lesion culprit in MI. Otherwise, mild non obstructive CAD elsewhere.  PCI with two drug eluting stents to the RCA and one drug eluting stent to the mid LAD.  Ventricular fibrillation x1 defibrillation.     Echocardiogram 12/17/2021:  Global and regional left ventricular function is normal with an EF of 60-65%.  Global right ventricular function is normal.  No significant valvular abnormalities present.  The inferior vena cava was normal in size with preserved respiratory  variability.  No pericardial effusion is present.    Assessment and Plan:   Thang Cagle is a 58 year old male with history of coronary artery disease status post NSTEMI f with " two-vessel coronary artery disease 2v CAD with RCA culprit s/p PCI to his LAD and RCA who presents for follow-up.     Coronary artery disease status post NSTEMI with two vessel severe CAD involving the mid LAD and mid to distal RCA with the mid RCA lesion culprit in MI, status post PCI to the RCA and mid LAD. Patient was not not sure about the medications he is taking.   -Continue aspirin stop Brilinta  -Atorvastatin 80 mg  -Continue carvedilol 6.25 mg twice daily and lisinopril 5 mg daily.    - Echocardiogram  - Lipid panel, will consider PCSK9 if LDL not at goal  - Referral to PCP    Chart review time:20 minutes  Visit time: 20 minutes  Total time spent: 40 minutes  >50% of this 20-minute visit were spent with the patient on in-person counseling and discussion regarding coronary artery disease.     The patient states understanding and is agreeable with plan.     Carlos Johnson MD  Cardiology    CC  SELF, REFERRED

## 2023-04-19 PROBLEM — I21.4 NSTEMI (NON-ST ELEVATED MYOCARDIAL INFARCTION) (H): Status: ACTIVE | Noted: 2023-04-19

## 2023-04-19 PROBLEM — R79.89 ELEVATED LFTS: Status: ACTIVE | Noted: 2023-04-19

## 2023-04-19 NOTE — PATIENT INSTRUCTIONS
Work to increase exercise!  Labs today.  Cut back on alcohol use.  I would check with insurance about coverage for shingles (zoster), tetatnus booster and hepatitis B, pneumonia vaccine.  Send in new insurance card.  Blood pressure looks great! Continue all medications.    Carpal tunnel  If carpal tunnel worsens, consider referral for surgery or hand therapy.  Use wrist braces, take breaks to stretch hands/wrists.    For post-nasal drip  -use zyrtec nightly before bed. This is safe to take daily.  -try netti pot  -can flonase nasal spray  -sleep study    Dr. John    Preventive Health Recommendations  Male Ages 50 - 64    Yearly exam:             See your health care provider every year in order to  o   Review health changes.   o   Discuss preventive care.    o   Review your medicines if your doctor has prescribed any.   Have a cholesterol test every 5 years, or more frequently if you are at risk for high cholesterol/heart disease.   Have a diabetes test (fasting glucose) every three years. If you are at risk for diabetes, you should have this test more often.   Have a colonoscopy at age 50, or have a yearly FIT test (stool test). These exams will check for colon cancer.    Talk with your health care provider about whether or not a prostate cancer screening test (PSA) is right for you.  You should be tested each year for STDs (sexually transmitted diseases), if you re at risk.     Shots: Get a flu shot each year. Get a tetanus shot every 10 years.     Nutrition:  Eat at least 5 servings of fruits and vegetables daily.   Eat whole-grain bread, whole-wheat pasta and brown rice instead of white grains and rice.   Get adequate Calcium and Vitamin D.     Lifestyle  Exercise for at least 150 minutes a week (30 minutes a day, 5 days a week). This will help you control your weight and prevent disease.   Limit alcohol to one drink per day.   No smoking.   Wear sunscreen to prevent skin cancer.   See your dentist every six  months for an exam and cleaning.   See your eye doctor every 1 to 2 years.

## 2023-04-20 ENCOUNTER — OFFICE VISIT (OUTPATIENT)
Dept: FAMILY MEDICINE | Facility: CLINIC | Age: 58
End: 2023-04-20
Attending: STUDENT IN AN ORGANIZED HEALTH CARE EDUCATION/TRAINING PROGRAM
Payer: COMMERCIAL

## 2023-04-20 VITALS
HEART RATE: 64 BPM | BODY MASS INDEX: 30.64 KG/M2 | WEIGHT: 214 LBS | RESPIRATION RATE: 15 BRPM | DIASTOLIC BLOOD PRESSURE: 87 MMHG | SYSTOLIC BLOOD PRESSURE: 125 MMHG | TEMPERATURE: 98.3 F | HEIGHT: 70 IN | OXYGEN SATURATION: 97 %

## 2023-04-20 DIAGNOSIS — R06.83 SNORING: ICD-10-CM

## 2023-04-20 DIAGNOSIS — Z11.59 NEED FOR HEPATITIS C SCREENING TEST: ICD-10-CM

## 2023-04-20 DIAGNOSIS — Z00.00 ROUTINE GENERAL MEDICAL EXAMINATION AT A HEALTH CARE FACILITY: Primary | ICD-10-CM

## 2023-04-20 DIAGNOSIS — G56.03 BILATERAL CARPAL TUNNEL SYNDROME: ICD-10-CM

## 2023-04-20 DIAGNOSIS — I25.118 CORONARY ARTERY DISEASE OF NATIVE ARTERY OF NATIVE HEART WITH STABLE ANGINA PECTORIS (H): ICD-10-CM

## 2023-04-20 DIAGNOSIS — Z13.1 SCREENING FOR DIABETES MELLITUS: ICD-10-CM

## 2023-04-20 DIAGNOSIS — Z11.4 SCREENING FOR HIV (HUMAN IMMUNODEFICIENCY VIRUS): ICD-10-CM

## 2023-04-20 DIAGNOSIS — R79.89 ELEVATED LFTS: ICD-10-CM

## 2023-04-20 DIAGNOSIS — I21.4 NSTEMI (NON-ST ELEVATED MYOCARDIAL INFARCTION) (H): ICD-10-CM

## 2023-04-20 DIAGNOSIS — Z86.19 HISTORY OF SHINGLES: ICD-10-CM

## 2023-04-20 DIAGNOSIS — Z12.11 SCREEN FOR COLON CANCER: ICD-10-CM

## 2023-04-20 DIAGNOSIS — I10 ESSENTIAL HYPERTENSION: ICD-10-CM

## 2023-04-20 DIAGNOSIS — Z12.5 SCREENING FOR PROSTATE CANCER: ICD-10-CM

## 2023-04-20 DIAGNOSIS — D75.89 MACROCYTOSIS: ICD-10-CM

## 2023-04-20 LAB
ALBUMIN SERPL BCG-MCNC: 5.1 G/DL (ref 3.5–5.2)
ALP SERPL-CCNC: 77 U/L (ref 40–129)
ALT SERPL W P-5'-P-CCNC: 153 U/L (ref 10–50)
ANION GAP SERPL CALCULATED.3IONS-SCNC: 14 MMOL/L (ref 7–15)
AST SERPL W P-5'-P-CCNC: 80 U/L (ref 10–50)
BILIRUB SERPL-MCNC: 0.9 MG/DL
BUN SERPL-MCNC: 19.2 MG/DL (ref 6–20)
CALCIUM SERPL-MCNC: 10 MG/DL (ref 8.6–10)
CHLORIDE SERPL-SCNC: 96 MMOL/L (ref 98–107)
CHOLEST SERPL-MCNC: 200 MG/DL
CREAT SERPL-MCNC: 1.12 MG/DL (ref 0.67–1.17)
DEPRECATED HCO3 PLAS-SCNC: 22 MMOL/L (ref 22–29)
ERYTHROCYTE [DISTWIDTH] IN BLOOD BY AUTOMATED COUNT: 12.3 % (ref 10–15)
FERRITIN SERPL-MCNC: 694 NG/ML (ref 31–409)
FOLATE SERPL-MCNC: 12.7 NG/ML (ref 4.6–34.8)
GFR SERPL CREATININE-BSD FRML MDRD: 77 ML/MIN/1.73M2
GLUCOSE SERPL-MCNC: 95 MG/DL (ref 70–99)
HBA1C MFR BLD: 5.8 % (ref 0–5.6)
HCT VFR BLD AUTO: 43.6 % (ref 40–53)
HDLC SERPL-MCNC: 58 MG/DL
HGB BLD-MCNC: 14.8 G/DL (ref 13.3–17.7)
LDLC SERPL CALC-MCNC: 118 MG/DL
MCH RBC QN AUTO: 33.5 PG (ref 26.5–33)
MCHC RBC AUTO-ENTMCNC: 33.9 G/DL (ref 31.5–36.5)
MCV RBC AUTO: 99 FL (ref 78–100)
NONHDLC SERPL-MCNC: 142 MG/DL
PLATELET # BLD AUTO: 188 10E3/UL (ref 150–450)
POTASSIUM SERPL-SCNC: 4.3 MMOL/L (ref 3.4–5.3)
PROT SERPL-MCNC: 8.1 G/DL (ref 6.4–8.3)
PSA SERPL DL<=0.01 NG/ML-MCNC: 0.24 NG/ML (ref 0–3.5)
RBC # BLD AUTO: 4.42 10E6/UL (ref 4.4–5.9)
SODIUM SERPL-SCNC: 132 MMOL/L (ref 136–145)
TRIGL SERPL-MCNC: 120 MG/DL
VIT B12 SERPL-MCNC: 553 PG/ML (ref 232–1245)
WBC # BLD AUTO: 5.5 10E3/UL (ref 4–11)

## 2023-04-20 PROCEDURE — 86803 HEPATITIS C AB TEST: CPT | Performed by: STUDENT IN AN ORGANIZED HEALTH CARE EDUCATION/TRAINING PROGRAM

## 2023-04-20 PROCEDURE — 82607 VITAMIN B-12: CPT | Performed by: STUDENT IN AN ORGANIZED HEALTH CARE EDUCATION/TRAINING PROGRAM

## 2023-04-20 PROCEDURE — 91312 COVID-19 VACCINE BIVALENT BOOSTER 12+ (PFIZER): CPT | Performed by: STUDENT IN AN ORGANIZED HEALTH CARE EDUCATION/TRAINING PROGRAM

## 2023-04-20 PROCEDURE — 99214 OFFICE O/P EST MOD 30 MIN: CPT | Mod: 25 | Performed by: STUDENT IN AN ORGANIZED HEALTH CARE EDUCATION/TRAINING PROGRAM

## 2023-04-20 PROCEDURE — 83036 HEMOGLOBIN GLYCOSYLATED A1C: CPT | Performed by: STUDENT IN AN ORGANIZED HEALTH CARE EDUCATION/TRAINING PROGRAM

## 2023-04-20 PROCEDURE — 80053 COMPREHEN METABOLIC PANEL: CPT | Performed by: STUDENT IN AN ORGANIZED HEALTH CARE EDUCATION/TRAINING PROGRAM

## 2023-04-20 PROCEDURE — 85027 COMPLETE CBC AUTOMATED: CPT | Performed by: STUDENT IN AN ORGANIZED HEALTH CARE EDUCATION/TRAINING PROGRAM

## 2023-04-20 PROCEDURE — 86706 HEP B SURFACE ANTIBODY: CPT | Performed by: STUDENT IN AN ORGANIZED HEALTH CARE EDUCATION/TRAINING PROGRAM

## 2023-04-20 PROCEDURE — 82746 ASSAY OF FOLIC ACID SERUM: CPT | Performed by: STUDENT IN AN ORGANIZED HEALTH CARE EDUCATION/TRAINING PROGRAM

## 2023-04-20 PROCEDURE — 0124A COVID-19 VACCINE BIVALENT BOOSTER 12+ (PFIZER): CPT | Performed by: STUDENT IN AN ORGANIZED HEALTH CARE EDUCATION/TRAINING PROGRAM

## 2023-04-20 PROCEDURE — 82728 ASSAY OF FERRITIN: CPT | Performed by: STUDENT IN AN ORGANIZED HEALTH CARE EDUCATION/TRAINING PROGRAM

## 2023-04-20 PROCEDURE — 87389 HIV-1 AG W/HIV-1&-2 AB AG IA: CPT | Performed by: STUDENT IN AN ORGANIZED HEALTH CARE EDUCATION/TRAINING PROGRAM

## 2023-04-20 PROCEDURE — 99386 PREV VISIT NEW AGE 40-64: CPT | Mod: 25 | Performed by: STUDENT IN AN ORGANIZED HEALTH CARE EDUCATION/TRAINING PROGRAM

## 2023-04-20 PROCEDURE — G0103 PSA SCREENING: HCPCS | Performed by: STUDENT IN AN ORGANIZED HEALTH CARE EDUCATION/TRAINING PROGRAM

## 2023-04-20 PROCEDURE — 80061 LIPID PANEL: CPT | Performed by: STUDENT IN AN ORGANIZED HEALTH CARE EDUCATION/TRAINING PROGRAM

## 2023-04-20 PROCEDURE — 86704 HEP B CORE ANTIBODY TOTAL: CPT | Performed by: STUDENT IN AN ORGANIZED HEALTH CARE EDUCATION/TRAINING PROGRAM

## 2023-04-20 PROCEDURE — 87340 HEPATITIS B SURFACE AG IA: CPT | Performed by: STUDENT IN AN ORGANIZED HEALTH CARE EDUCATION/TRAINING PROGRAM

## 2023-04-20 PROCEDURE — 36415 COLL VENOUS BLD VENIPUNCTURE: CPT | Performed by: STUDENT IN AN ORGANIZED HEALTH CARE EDUCATION/TRAINING PROGRAM

## 2023-04-20 ASSESSMENT — ENCOUNTER SYMPTOMS
COUGH: 0
SHORTNESS OF BREATH: 0
JOINT SWELLING: 0
NERVOUS/ANXIOUS: 0
HEMATURIA: 0
FEVER: 0
ARTHRALGIAS: 0
WEAKNESS: 0
SORE THROAT: 0
PALPITATIONS: 0
CHILLS: 0
ABDOMINAL PAIN: 0
HEMATOCHEZIA: 0
DIARRHEA: 0
NAUSEA: 0
FREQUENCY: 1
HEARTBURN: 0
DYSURIA: 0
HEADACHES: 0
PARESTHESIAS: 0
DIZZINESS: 0
MYALGIAS: 0
CONSTIPATION: 0
EYE PAIN: 0

## 2023-04-20 ASSESSMENT — PAIN SCALES - GENERAL: PAINLEVEL: NO PAIN (0)

## 2023-04-20 NOTE — PROGRESS NOTES
SUBJECTIVE:   CC: Jose is an 58 year old who presents for preventative health visit.       4/20/2023    12:49 PM   Additional Questions   Roomed by shayy esqueda   Accompanied by none         4/20/2023    12:49 PM   Patient Reported Additional Medications   Patient reports taking the following new medications none     Patient has been advised of split billing requirements and indicates understanding: Yes     Healthy Habits:     Getting at least 3 servings of Calcium per day:  NO    Bi-annual eye exam:  NO    Dental care twice a year:  NO    Sleep apnea or symptoms of sleep apnea:  Sleep apnea    Diet:  Regular (no restrictions)    Frequency of exercise:  1 day/week    Duration of exercise:  Less than 15 minutes    Taking medications regularly:  Yes    Medication side effects:  Not applicable    PHQ-2 Total Score: 0    Additional concerns today:  Yes    Work-  Diet-well balanced, eats healthy  Exercise-has weights, lots of walking    Colon cancer screening-due  -never been tested    Prostate cancer screening-due  -urinating more often  -not waking up in night  -no dribbling    HTN  Hx NSTEMI w/ 2 vessel CAD. S/p PCI to LAD and RCA.  -coreg 6.25mg BID  -aspirin 81mg (stopped brillinta)  -atorvastatin 80mg  -carvedilol 6.25mg BID  -lisinopril 5mg daily  -follows with cardiology, last seen march 2023  -no chest pain    Hx elevated LFTs  Hx macrocytosis  -due for labs    Carpal tunnel  -, lots of lifting, paperwork    Sleep apnea  -nose strips    Difficulty sleeping due to shingles pain, sleep apnea, carpal tunnel.    Today's PHQ-2 Score:       4/20/2023    12:44 PM   PHQ-2 ( 1999 [employer])   Q1: Little interest or pleasure in doing things 0   Q2: Feeling down, depressed or hopeless 0   PHQ-2 Score 0   Q1: Little interest or pleasure in doing things Not at all   Q2: Feeling down, depressed or hopeless Not at all   PHQ-2 Score 0     Have you ever done Advance Care Planning? (For example, a Health Directive, POLST, or a  discussion with a medical provider or your loved ones about your wishes): No, advance care planning information given to patient to review.  Patient plans to discuss their wishes with loved ones or provider.      Social History     Tobacco Use     Smoking status: Never     Passive exposure: Never     Smokeless tobacco: Never   Vaping Use     Vaping status: Never Used   Substance Use Topics     Alcohol use: Yes     Comment: 3-4 wk           4/20/2023    12:44 PM   Alcohol Use   Prescreen: >3 drinks/day or >7 drinks/week? No       Last PSA: No results found for: PSA    Reviewed orders with patient. Reviewed health maintenance and updated orders accordingly - Yes    Reviewed and updated as needed this visit by clinical staff   Tobacco  Allergies  Meds  Problems             Reviewed and updated as needed this visit by Provider      Problems            No past medical history on file.   Past Surgical History:   Procedure Laterality Date     CV HEART CATHETERIZATION WITH POSSIBLE INTERVENTION N/A 12/17/2021    Procedure: Heart Catheterization with Possible Intervention;  Surgeon: Cade Nicholson MD;  Location: [facility] CARDIAC CATH LAB     CV PCI ANGIOPLASTY N/A 12/17/2021    Procedure: Percutaneous Transluminal Angioplasty;  Surgeon: Cade Nicholson MD;  Location:  HEART CARDIAC CATH LAB       Review of Systems   Constitutional: Negative for chills and fever.   HENT: Negative for congestion, ear pain, hearing loss and sore throat.    Eyes: Negative for pain and visual disturbance.   Respiratory: Negative for cough and shortness of breath.    Cardiovascular: Negative for chest pain, palpitations and peripheral edema.   Gastrointestinal: Negative for abdominal pain, constipation, diarrhea, heartburn, hematochezia and nausea.   Genitourinary: Positive for frequency. Negative for dysuria, genital sores, hematuria, impotence, penile discharge and urgency.   Musculoskeletal: Negative for  "arthralgias, joint swelling and myalgias.   Skin: Negative for rash.   Neurological: Negative for dizziness, weakness, headaches and paresthesias.   Psychiatric/Behavioral: Negative for mood changes. The patient is not nervous/anxious.      OBJECTIVE:   /87 (BP Location: Right arm, Patient Position: Chair, Cuff Size: Adult Large)   Pulse 64   Temp 98.3  F (36.8  C) (Temporal)   Resp 15   Ht 1.765 m (5' 9.5\")   Wt 97.1 kg (214 lb)   SpO2 97%   BMI 31.15 kg/m      Physical Exam  Constitutional:       General: He is not in acute distress.     Appearance: He is well-developed. He is not ill-appearing.   HENT:      Head: Normocephalic and atraumatic.      Right Ear: Tympanic membrane, ear canal and external ear normal.      Left Ear: Tympanic membrane, ear canal and external ear normal.      Nose: Nose normal.      Mouth/Throat:      Mouth: Mucous membranes are moist.      Pharynx: No oropharyngeal exudate.   Eyes:      Conjunctiva/sclera: Conjunctivae normal.      Pupils: Pupils are equal, round, and reactive to light.   Cardiovascular:      Rate and Rhythm: Normal rate and regular rhythm.      Pulses: Normal pulses.      Heart sounds: Normal heart sounds. No murmur heard.  Pulmonary:      Effort: Pulmonary effort is normal.      Breath sounds: No wheezing or rales.   Abdominal:      General: Bowel sounds are normal.      Palpations: Abdomen is soft.      Tenderness: There is no abdominal tenderness.   Musculoskeletal:      Cervical back: Normal range of motion and neck supple. No rigidity.   Lymphadenopathy:      Cervical: No cervical adenopathy.   Skin:     General: Skin is warm and dry.      Findings: No rash.   Neurological:      General: No focal deficit present.      Mental Status: He is alert and oriented to person, place, and time.   Psychiatric:         Mood and Affect: Mood normal.         Behavior: Behavior normal.        ASSESSMENT/PLAN:   Thang was seen today for establish care and " physical.    Routine general medical examination at a health care facility  -Vitals WNL  -Labs: screening psa, a1c, hiv, hep c, cmp, cbc  -Immunizations: covid booster   -will ask insurance about coverage for other vaccines  -Colon Cancer screening: colonoscopy ordered  -Exercise: active w/work, walking; encouraged to increase activity outside of work  -Diet: well balanced  -Sleep: poor    Screening for prostate cancer  -     PSA, screen; Future  -     PSA, screen    Screening for diabetes mellitus  -     Hemoglobin A1c; Future  -     Hemoglobin A1c    Screening for HIV (human immunodeficiency virus)  -     HIV Antigen Antibody Combo; Future  -     HIV Antigen Antibody Combo    Need for hepatitis C screening test  -     Hepatitis C Screen Reflex to HCV RNA Quant and Genotype; Future  -     Hepatitis C Screen Reflex to HCV RNA Quant and Genotype    Screen for colon cancer  -     Colonoscopy Screening  Referral; Future    Coronary artery disease of native artery of native heart with stable angina pectoris (H)  NSTEMI (non-ST elevated myocardial infarction) (H)  Essential hypertension  Follows with cardiology, last seen march 2023. BP at goal. Hx NSTEMI w/ 2 vessel CAD. S/p PCI to LAD and RCA. Continue coreg 6.25mg BID, aspirin 81mg, atorvastatin 80mg, lisinopril 5mg daily. Lipids ordered.  -     Lipid panel reflex to direct LDL Fasting  -     Comprehensive metabolic panel; Future  -     CBC with platelets; Future    Elevated LFTs  Macrocytosis  Noted in past. Will recheck. Does endorse drinking too much.  -     Comprehensive metabolic panel  -     CBC with platelets; Future  -     Vitamin B12; Future  -     Folate; Future  -     Ferritin; Future  -     CBC with platelets  -     Vitamin B12  -     Folate  -     Ferritin    Snoring  Discussed zyrtec and flonase for postnasal drip. Sleep study ordered.  -     Adult Sleep Eval & Management  Referral; Future    Hx shingles  Endorses persistent nerve pain  "on chest after having shingles 15yrs ago. Declines medications/creams to help.    Carpal tunnel bilateral  Likely related to work as a . Recommend wrist braces. Consider hand therapy or surgery if no improvement.    Other orders  -     COVID-19,PF,PFIZER BOOSTER BIVALENT (12+YRS)      COUNSELING:   Reviewed preventive health counseling, as reflected in patient instructions      BMI:   Estimated body mass index is 31.15 kg/m  as calculated from the following:    Height as of this encounter: 1.765 m (5' 9.5\").    Weight as of this encounter: 97.1 kg (214 lb).   Weight management plan: Discussed healthy diet and exercise guidelines      He reports that he has never smoked. He has never been exposed to tobacco smoke. He has never used smokeless tobacco.    Felipe John DO  [facility]  "

## 2023-04-20 NOTE — LETTER
April 24, 2023      Jose Cagle  [address]  [facility] [address]        Dear ,    We are writing to inform you of your test results.    -folic acid, b12 are normal  -prostate cancer screening is normal-repeat in 1 yr  -hepatitis C & HIV screening & blood counts are normal  -The size of your red blood cells are high. This is likely due to alcohol use. Work to cut back on alcohol consumption.  -Your hemoglobin A1C test is mildly elevated at 5.8% indicating prediabetes. Weight loss, healthy diet, and walking for 5-10 minutes after each meal can help return this to normal. Follow up in 6 months for repeat A1C.  -ferritin (iron stores) are high, unclear why. It can be elevated after an illness. Something we should monitor and recheck next time you are in clinic.     Liver enzymes are elevated and have been for the past year. Please cut back on alcohol and limit tylenol or supplement use. I added additional orders to your blood work to see why they are elevated. I recommend you get a liver ultrasound as well.    Hepatitis labs are normal. You are not immune to hepatitis B-I recommend getting the 3 part vaccine.      Resulted Orders   Comprehensive metabolic panel   Result Value Ref Range    Sodium 132 (L) 136 - 145 mmol/L    Potassium 4.3 3.4 - 5.3 mmol/L    Chloride 96 (L) 98 - 107 mmol/L    Carbon Dioxide (CO2) 22 22 - 29 mmol/L    Anion Gap 14 7 - 15 mmol/L    Urea Nitrogen 19.2 6.0 - 20.0 mg/dL    Creatinine 1.12 0.67 - 1.17 mg/dL    Calcium 10.0 8.6 - 10.0 mg/dL    Glucose 95 70 - 99 mg/dL    Alkaline Phosphatase 77 40 - 129 U/L    AST 80 (H) 10 - 50 U/L     (H) 10 - 50 U/L    Protein Total 8.1 6.4 - 8.3 g/dL    Albumin 5.1 3.5 - 5.2 g/dL    Bilirubin Total 0.9 <=1.2 mg/dL    GFR Estimate 77 >60 mL/min/1.73m2      Comment:      eGFR calculated using 2021 CKD-EPI equation.   CBC with platelets   Result Value Ref Range    WBC Count 5.5 4.0 - 11.0 10e3/uL    RBC Count 4.42 4.40 - 5.90  10e6/uL    Hemoglobin 14.8 13.3 - 17.7 g/dL    Hematocrit 43.6 40.0 - 53.0 %    MCV 99 78 - 100 fL    MCH 33.5 (H) 26.5 - 33.0 pg    MCHC 33.9 31.5 - 36.5 g/dL    RDW 12.3 10.0 - 15.0 %    Platelet Count 188 150 - 450 10e3/uL   Vitamin B12   Result Value Ref Range    Vitamin B12 553 232 - 1,245 pg/mL   Folate   Result Value Ref Range    Folic Acid 12.7 4.6 - 34.8 ng/mL   Ferritin   Result Value Ref Range    Ferritin 694 (H) 31 - 409 ng/mL   PSA, screen   Result Value Ref Range    Prostate Specific Antigen Screen 0.24 0.00 - 3.50 ng/mL    Narrative    This result is obtained using the Roche Elecsys total PSA method on the cliff [id number] immunoassay analyzer. Results obtained with different assay methods or kits cannot be used interchangeably.   HIV Antigen Antibody Combo   Result Value Ref Range    HIV Antigen Antibody Combo Nonreactive Nonreactive      Comment:      HIV-1 p24 Ag & HIV-1/HIV-2 Ab Not Detected   Hepatitis C Screen Reflex to HCV RNA Quant and Genotype   Result Value Ref Range    Hepatitis C Antibody Nonreactive Nonreactive    Narrative    Assay performance characteristics have not been established for newborns, infants, and children.   Hemoglobin A1c   Result Value Ref Range    Hemoglobin A1C 5.8 (H) 0.0 - 5.6 %      Comment:      Normal <5.7%   Prediabetes 5.7-6.4%    Diabetes 6.5% or higher     Note: Adopted from ADA consensus guidelines.   Hepatitis B surface antigen   Result Value Ref Range    Hepatitis B Surface Antigen Nonreactive Nonreactive   Hepatitis B Surface Antibody   Result Value Ref Range    Hepatitis B Surface Antibody Instrument Value 0.15 <8.00 m[IU]/mL    Hepatitis B Surface Antibody Nonreactive       Comment:      No antibody detected when the value is less than 8.00 mIU/mL.   Hepatitis B core antibody   Result Value Ref Range    Hepatitis B Core Antibody Total Nonreactive Nonreactive       If you have any questions or concerns, please call the clinic at the number listed above.        Sincerely,      Felipe John, DO

## 2023-04-21 DIAGNOSIS — R79.89 ELEVATED LFTS: Primary | ICD-10-CM

## 2023-04-21 DIAGNOSIS — R73.03 PREDIABETES: ICD-10-CM

## 2023-04-21 LAB
HBV CORE AB SERPL QL IA: NONREACTIVE
HBV SURFACE AB SERPL IA-ACNC: 0.15 M[IU]/ML
HBV SURFACE AB SERPL IA-ACNC: NONREACTIVE M[IU]/ML
HBV SURFACE AG SERPL QL IA: NONREACTIVE
HCV AB SERPL QL IA: NONREACTIVE
HIV 1+2 AB+HIV1 P24 AG SERPL QL IA: NONREACTIVE

## 2023-04-24 ENCOUNTER — ANCILLARY PROCEDURE (OUTPATIENT)
Dept: CARDIOLOGY | Facility: CLINIC | Age: 58
End: 2023-04-24
Attending: STUDENT IN AN ORGANIZED HEALTH CARE EDUCATION/TRAINING PROGRAM
Payer: COMMERCIAL

## 2023-04-24 DIAGNOSIS — I25.118 CORONARY ARTERY DISEASE OF NATIVE ARTERY OF NATIVE HEART WITH STABLE ANGINA PECTORIS (H): ICD-10-CM

## 2023-04-24 LAB — LVEF ECHO: NORMAL

## 2023-04-24 PROCEDURE — 93306 TTE W/DOPPLER COMPLETE: CPT | Performed by: INTERNAL MEDICINE

## 2023-05-14 ENCOUNTER — HEALTH MAINTENANCE LETTER (OUTPATIENT)
Age: 58
End: 2023-05-14

## 2023-05-26 ENCOUNTER — OFFICE VISIT (OUTPATIENT)
Dept: CARDIOLOGY | Facility: CLINIC | Age: 58
End: 2023-05-26
Attending: STUDENT IN AN ORGANIZED HEALTH CARE EDUCATION/TRAINING PROGRAM
Payer: COMMERCIAL

## 2023-05-26 VITALS
OXYGEN SATURATION: 97 % | SYSTOLIC BLOOD PRESSURE: 125 MMHG | HEART RATE: 66 BPM | DIASTOLIC BLOOD PRESSURE: 88 MMHG | WEIGHT: 217.6 LBS | BODY MASS INDEX: 31.67 KG/M2

## 2023-05-26 DIAGNOSIS — I25.118 CORONARY ARTERY DISEASE OF NATIVE ARTERY OF NATIVE HEART WITH STABLE ANGINA PECTORIS (H): ICD-10-CM

## 2023-05-26 DIAGNOSIS — E78.5 HYPERLIPIDEMIA, UNSPECIFIED HYPERLIPIDEMIA TYPE: Primary | ICD-10-CM

## 2023-05-26 LAB
ATRIAL RATE - MUSE: 62 BPM
DIASTOLIC BLOOD PRESSURE - MUSE: NORMAL MMHG
INTERPRETATION ECG - MUSE: NORMAL
P AXIS - MUSE: 70 DEGREES
PR INTERVAL - MUSE: 182 MS
QRS DURATION - MUSE: 80 MS
QT - MUSE: 416 MS
QTC - MUSE: 422 MS
R AXIS - MUSE: 18 DEGREES
SYSTOLIC BLOOD PRESSURE - MUSE: NORMAL MMHG
T AXIS - MUSE: 21 DEGREES
VENTRICULAR RATE- MUSE: 62 BPM

## 2023-05-26 PROCEDURE — G0463 HOSPITAL OUTPT CLINIC VISIT: HCPCS | Performed by: STUDENT IN AN ORGANIZED HEALTH CARE EDUCATION/TRAINING PROGRAM

## 2023-05-26 PROCEDURE — 99215 OFFICE O/P EST HI 40 MIN: CPT | Performed by: STUDENT IN AN ORGANIZED HEALTH CARE EDUCATION/TRAINING PROGRAM

## 2023-05-26 PROCEDURE — 93010 ELECTROCARDIOGRAM REPORT: CPT | Performed by: INTERNAL MEDICINE

## 2023-05-26 PROCEDURE — 93005 ELECTROCARDIOGRAM TRACING: CPT | Mod: RTG

## 2023-05-26 ASSESSMENT — PAIN SCALES - GENERAL: PAINLEVEL: NO PAIN (0)

## 2023-05-26 NOTE — PROGRESS NOTES
Chief complaint: Follow up.     HPI:   Thang Cagle is a 58 year old male with history of coronary artery disease status post NSTEMI f with two-vessel coronary artery disease 2v CAD with RCA culprit s/p PCI to his LAD and RCA who presents for follow-up.     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     PAST MEDICAL HISTORY:  No past medical history on file.    CURRENT MEDICATIONS:  Current Outpatient Medications   Medication Sig Dispense Refill     aspirin (ASA) 81 MG chewable tablet Take 1 tablet (81 mg) by mouth daily Starting tomorrow. 30 tablet 3     atorvastatin (LIPITOR) 80 MG tablet Take 1 tablet (80 mg) by mouth daily 90 tablet 3     carvedilol (COREG) 6.25 MG tablet Take 1 tablet (6.25 mg) by mouth 2 times daily 180 tablet 3     lisinopril (ZESTRIL) 5 MG tablet Take 1 tablet (5 mg) by mouth daily 90 tablet 3     nitroGLYcerin (NITROSTAT) 0.4 MG sublingual tablet For chest pain place 1 tablet under the tongue every 5 minutes for 3 doses. If symptoms persist 5 minutes after 1st dose call 911. (Patient not taking: Reported on 4/20/2023) 25 tablet 1       PAST SURGICAL HISTORY:  Past Surgical History:   Procedure Laterality Date     CV HEART CATHETERIZATION WITH POSSIBLE INTERVENTION N/A 12/17/2021    Procedure: Heart Catheterization with Possible Intervention;  Surgeon: Cade Nicholson MD;  Location: [facility] CARDIAC CATH LAB     CV PCI ANGIOPLASTY N/A 12/17/2021    Procedure: Percutaneous Transluminal Angioplasty;  Surgeon: Cade Nicholson MD;  Location: [facility] CARDIAC CATH LAB       ALLERGIES:   No Known Allergies    FAMILY HISTORY:  Mother with premature CAD in her 50s.     SOCIAL HISTORY:  Social History     Tobacco Use     Smoking status: Never     Passive exposure: Never     Smokeless tobacco: Never   Vaping Use     Vaping status: Never Used   Substance Use Topics     Alcohol use: Yes     Comment: 3-4 wk     Drug use: No        ROS:   A comprehensive 14 point review of systems is negative other than as mentioned in HPI.    Exam:  /88 (BP Location: Right arm, Patient Position: Sitting, Cuff Size: Adult Large)   Pulse 66   Wt 98.7 kg (217 lb 9.6 oz)   SpO2 97%   BMI 31.67 kg/m    GENERAL APPEARANCE: healthy, alert and no distress  EYES: no icterus, no xanthelasmas  ENT: normal palate, mucosa moist, no central cyanosis  NECK: JVP not elevated  RESPIRATORY: lungs clear to auscultation - no rales, rhonchi or wheezes, no use of accessory muscles, no retractions, respirations are unlabored, normal respiratory rate  CARDIOVASCULAR: regular rhythm, normal S1 with physiologic split S2, no S3 or S4 and no murmur, click or rub.  EXTREMITIES: no edema, no bruits    Labs:  Reviewed.   LDL Cholesterol Calculated   Date Value Ref Range Status   04/20/2023 118 (H) <=100 mg/dL Final       Testing/Procedures:    Coronary Angiogram 12/17/2021:  NSTEMI  Two vessel severe CAD involving the mid LAD and mid to distal RCA with the mid RCA lesion culprit in MI. Otherwise, mild non obstructive CAD elsewhere.  PCI with two drug eluting stents to the RCA and one drug eluting stent to the mid LAD.  Ventricular fibrillation x1 defibrillation.     Echocardiogram 12/17/2021:  Global and regional left ventricular function is normal with an EF of 60-65%.  Global right ventricular function is normal.  No significant valvular abnormalities present.  The inferior vena cava was normal in size with preserved respiratory  variability.  No pericardial effusion is present.    Assessment and Plan:   Thang Cagle is a 58 year old male with history of coronary artery disease status post NSTEMI f with two-vessel coronary artery disease 2v CAD with RCA culprit s/p PCI to his LAD and RCA who presents for follow-up.     Coronary artery disease status post NSTEMI with two vessel severe CAD involving the mid LAD and mid to distal RCA with the mid RCA lesion culprit  in MI, status post PCI to the RCA and mid LAD. Patient was not not sure about the medications he is taking.   -Continue aspirin   -Atorvastatin 80 mg  -Continue carvedilol 6.25 mg twice daily and lisinopril 5 mg daily.      I reccommend to either start with Praluent 150 mg subcutaneously every 2 weeks or Repatha 140mg oh subcutaneously every 2 weeks in function of the PCSK9 inhibitors which is accepted by his health care insurance  Follow-up as planned once the PCSK9 medication is approved fasting lipid labs 1 week after the second injection then after 3 months and then a follow-up within 1 year directly continue the rest of the medications unchanged.  We discussed the importance of a heart healthy diet and lifestyle.    Chart review time:20 minutes  Visit time: 20 minutes  Total time spent: 40 minutes  >50% of this 20-minute visit were spent with the patient on in-person counseling and discussion regarding coronary artery disease.     The patient states understanding and is agreeable with plan.     Carlos Johnson MD  Cardiology    CC  SELF, REFERRED

## 2023-05-26 NOTE — NURSING NOTE
Chief Complaint   Patient presents with     Follow Up     Return Core          Vitals were taken and medications reconciled.     Doc Onofre, Visit Facilitator    7:56 AM

## 2023-05-26 NOTE — NURSING NOTE
May 26, 2023    Medication Changes: Start Praluent or Repatha, whichever is covered by insurance. This will be an at-home injection, given 1x every 2 weeks.      Follow Up:   -With Dr. Johnson in 3 months with fasting labs prior to visit    Patient verbalized understanding of all health information given and agreed to call with further questions or concerns.      Elba Farmer RN

## 2023-05-26 NOTE — LETTER
5/26/2023      RE: Thang Cagle  [address]  [facility] [address]       Dear Colleague,    Thank you for the opportunity to participate in the care of your patient, Thang Cagle, at the [facility] [place] [facility]. Please see a copy of my visit note below.    Chief complaint: Follow up.     HPI:   Thang Cagle is a 58 year old male with history of coronary artery disease status post NSTEMI f with two-vessel coronary artery disease 2v CAD with RCA culprit s/p PCI to his LAD and RCA who presents for follow-up.     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     PAST MEDICAL HISTORY:  No past medical history on file.    CURRENT MEDICATIONS:  Current Outpatient Medications   Medication Sig Dispense Refill     aspirin (ASA) 81 MG chewable tablet Take 1 tablet (81 mg) by mouth daily Starting tomorrow. 30 tablet 3     atorvastatin (LIPITOR) 80 MG tablet Take 1 tablet (80 mg) by mouth daily 90 tablet 3     carvedilol (COREG) 6.25 MG tablet Take 1 tablet (6.25 mg) by mouth 2 times daily 180 tablet 3     lisinopril (ZESTRIL) 5 MG tablet Take 1 tablet (5 mg) by mouth daily 90 tablet 3     nitroGLYcerin (NITROSTAT) 0.4 MG sublingual tablet For chest pain place 1 tablet under the tongue every 5 minutes for 3 doses. If symptoms persist 5 minutes after 1st dose call 911. (Patient not taking: Reported on 4/20/2023) 25 tablet 1       PAST SURGICAL HISTORY:  Past Surgical History:   Procedure Laterality Date     CV HEART CATHETERIZATION WITH POSSIBLE INTERVENTION N/A 12/17/2021    Procedure: Heart Catheterization with Possible Intervention;  Surgeon: Cade Nicholson MD;  Location: [facility] CARDIAC CATH LAB     CV PCI ANGIOPLASTY N/A 12/17/2021    Procedure: Percutaneous Transluminal Angioplasty;  Surgeon: Cade Nicholson MD;  Location: [facility] CARDIAC CATH LAB        ALLERGIES:   No Known Allergies    FAMILY HISTORY:  Mother with premature CAD in her 50s.     SOCIAL HISTORY:  Social History     Tobacco Use     Smoking status: Never     Passive exposure: Never     Smokeless tobacco: Never   Vaping Use     Vaping status: Never Used   Substance Use Topics     Alcohol use: Yes     Comment: 3-4 wk     Drug use: No       ROS:   A comprehensive 14 point review of systems is negative other than as mentioned in HPI.    Exam:  /88 (BP Location: Right arm, Patient Position: Sitting, Cuff Size: Adult Large)   Pulse 66   Wt 98.7 kg (217 lb 9.6 oz)   SpO2 97%   BMI 31.67 kg/m    GENERAL APPEARANCE: healthy, alert and no distress  EYES: no icterus, no xanthelasmas  ENT: normal palate, mucosa moist, no central cyanosis  NECK: JVP not elevated  RESPIRATORY: lungs clear to auscultation - no rales, rhonchi or wheezes, no use of accessory muscles, no retractions, respirations are unlabored, normal respiratory rate  CARDIOVASCULAR: regular rhythm, normal S1 with physiologic split S2, no S3 or S4 and no murmur, click or rub.  EXTREMITIES: no edema, no bruits    Labs:  Reviewed.   LDL Cholesterol Calculated   Date Value Ref Range Status   04/20/2023 118 (H) <=100 mg/dL Final       Testing/Procedures:    Coronary Angiogram 12/17/2021:  NSTEMI  Two vessel severe CAD involving the mid LAD and mid to distal RCA with the mid RCA lesion culprit in MI. Otherwise, mild non obstructive CAD elsewhere.  PCI with two drug eluting stents to the RCA and one drug eluting stent to the mid LAD.  Ventricular fibrillation x1 defibrillation.     Echocardiogram 12/17/2021:  Global and regional left ventricular function is normal with an EF of 60-65%.  Global right ventricular function is normal.  No significant valvular abnormalities present.  The inferior vena cava was normal in size with preserved respiratory  variability.  No pericardial effusion is present.    Assessment and Plan:   Thang HANCOCK  Gurjit is a 58 year old male with history of coronary artery disease status post NSTEMI f with two-vessel coronary artery disease 2v CAD with RCA culprit s/p PCI to his LAD and RCA who presents for follow-up.     Coronary artery disease status post NSTEMI with two vessel severe CAD involving the mid LAD and mid to distal RCA with the mid RCA lesion culprit in MI, status post PCI to the RCA and mid LAD. Patient was not not sure about the medications he is taking.   -Continue aspirin   -Atorvastatin 80 mg  -Continue carvedilol 6.25 mg twice daily and lisinopril 5 mg daily.      I reccommend to either start with Praluent 150 mg subcutaneously every 2 weeks or Repatha 140mg oh subcutaneously every 2 weeks in function of the PCSK9 inhibitors which is accepted by his health care insurance  Follow-up as planned once the PCSK9 medication is approved fasting lipid labs 1 week after the second injection then after 3 months and then a follow-up within 1 year directly continue the rest of the medications unchanged.  We discussed the importance of a heart healthy diet and lifestyle.    Chart review time:20 minutes  Visit time: 20 minutes  Total time spent: 40 minutes  >50% of this 20-minute visit were spent with the patient on in-person counseling and discussion regarding coronary artery disease.     The patient states understanding and is agreeable with plan.     Carlos Johnson MD  Cardiology    CC  SELF, REFERRED          Please do not hesitate to contact me if you have any questions/concerns.     Sincerely,     Elizabeth Johnson MD

## 2023-05-26 NOTE — PATIENT INSTRUCTIONS
May 26, 2023    Cardiology Provider You Saw During Your Visit: Dr. Johnson      Medication Changes: Start Praluent or Repatha, whichever is covered by insurance. This will be an at-home injection, given 1x every 2 weeks.      Follow Up:   -With Dr. Johnson in 3 months with fasting labs prior to visit        Follow the American Heart Association Diet and Lifestyle Recommendations:  -Limit saturated fat, trans fat, sodium, red meat, sweets and sugar-sweetened beverages. If you choose to eat red meat, compare labels and select the leanest cuts available.  -Aim for at least 150 minutes of moderate physical activity or 75 minutes of vigorous physical activity - or an equal combination of both - each week.      To Reach Us:  -During business hours: [phone], press option # 1 to schedule an appointment or to leave a message for your care team.     -After hours, weekends or holidays: [phone], press option #4 and ask to speak to the on-call cardiologist. Inform them you are a patient at the [facility].        **If you have a cardiac device, please make sure you schedule an in-person device check just prior to your cardiology provider appointments**        Elba Farmer RN  Cardiology Care Coordinator - General Cardiology  [facility]

## 2023-06-02 ENCOUNTER — MYC MEDICAL ADVICE (OUTPATIENT)
Dept: CARDIOLOGY | Facility: CLINIC | Age: 58
End: 2023-06-02

## 2023-06-28 DIAGNOSIS — I25.118 CORONARY ARTERY DISEASE OF NATIVE ARTERY OF NATIVE HEART WITH STABLE ANGINA PECTORIS (H): ICD-10-CM

## 2023-06-30 RX ORDER — LISINOPRIL 5 MG/1
5 TABLET ORAL DAILY
Qty: 90 TABLET | Refills: 3 | Status: SHIPPED | OUTPATIENT
Start: 2023-06-30 | End: 2024-06-07

## 2023-06-30 NOTE — TELEPHONE ENCOUNTER
lisinopril (ZESTRIL) 5 MG tablet   Nael Duke MD  Cardiovascular Disease       Last visit  5/26/2023   Future visit 8/25/2023  [facility]           ACE Inhibitors (Including Combos) Protocol Passed 06/30/2023 09:43 AM   Protocol Details  Blood pressure under 140/90 in past 12 months    Recent (12 mo) or future (30 days) visit within the authorizing provider's specialty    Medication is active on med list    Patient is age 18 or older    Normal serum creatinine on file in past 12 months    Normal serum potassium on file in past 12 months

## 2024-05-28 DIAGNOSIS — I25.118 CORONARY ARTERY DISEASE OF NATIVE ARTERY OF NATIVE HEART WITH STABLE ANGINA PECTORIS (H): ICD-10-CM

## 2024-06-03 RX ORDER — ATORVASTATIN CALCIUM 80 MG/1
80 TABLET, FILM COATED ORAL DAILY
Qty: 90 TABLET | Refills: 0 | Status: SHIPPED | OUTPATIENT
Start: 2024-06-03

## 2024-06-03 NOTE — TELEPHONE ENCOUNTER
atorvastatin (LIPITOR) 80 MG tablet 90 tablet 3 3/31/2023     Last Office Visit: 5/26/23  Future Office visit:   none    5/26/23 clinic visit--Follow Up:   -With Dr. Johnson in 3 months with fasting labs prior to visit    Antihyperlipidemic agents [id number]/28/2024 09:42 AM   Protocol Details LDL on file in the past 12 months    Recent (12 mo) or future (90 days) visit within the authorizing provider's specialty          90 day owen refill sent to the pharmacy. Overdue labs per the refill protocol and appt per clinic notes.    Shireen Dugan RN  P Red Flag Triage/MRT

## 2024-06-05 ENCOUNTER — TELEPHONE (OUTPATIENT)
Dept: CARDIOLOGY | Facility: CLINIC | Age: 59
End: 2024-06-05
Payer: COMMERCIAL

## 2024-06-05 NOTE — TELEPHONE ENCOUNTER
6/5/2024 4:25PM Debra Montelongo  Patient confirmed scheduled appointment:  Date: 6/7/2024  Time: 10:30AM  Visit type: Return Provider Change (Return Cardiology)  Provider: Dr. Orosco  [place] [address]rd [place] [address]  Testing/imaging: fasting labs (1st floor Imaging) prior at 9:45AM  Additional notes: 6/5 Scheduled Return Provider Change (Return Cardiology) w/ Dr. Orosco w/ fasting lab prior 6/7. Pt previously saw Dr. Johnson who has moved to  (and doesn't have any openings). [place] [facility] [place] YE Montelongo 6/5/2024 4:25PM

## 2024-06-06 NOTE — PROGRESS NOTES
Chief Complaint: Establish general cardiovascular care    HPI (6/7/24): Thang Cagle (pronounced 'hector') is a 59 year old male with a past medical history of coronary artery disease (NSTEMI with prior PCI to RCA and LAD) who was referred to me to establish general cardiovascular care.  He was last seen by my colleague, Dr. Carlos Johnson, in May 2023.    Briefly, Mr. Cagle came to the attention of cardiology in 2021, when he presented with chest pain. He went on to have a coronary angiogram, with lesions identified in the mid LAD and mid-distal RCA. Both were stented. He has since followed up with Dr. Johnson and has been devoid of symptoms.      Today, Mr. Cagle notes that he feels well. His chief complaint is MSK joint pain in the hip.  He notes that he has some difficulty taking the second dose of Coreg in the evening.  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  At the time of the consultation, he notes an absence of chest pain at rest, dyspnea at rest or with exertion, PND, orthopnea, peripheral edema, palpitations, lightheadedness or syncope.     A comprehensive ROS was done and the details are included above in the HPI.    Past Medical History:  Coronary artery disease (NSTEMI with prior PCI to RCA and LAD in 2021)  Dyslipidemia  Hypertension    Past Surgical History:    Past Surgical History:   Procedure Laterality Date    CV HEART CATHETERIZATION WITH POSSIBLE INTERVENTION N/A 12/17/2021    Procedure: Heart Catheterization with Possible Intervention;  Surgeon: Cade Nicholson MD;  Location: [facility] CARDIAC CATH LAB    CV PCI ANGIOPLASTY N/A 12/17/2021    Procedure: Percutaneous Transluminal Angioplasty;  Surgeon: Cade Nicholson MD;  Location: [facility] CARDIAC CATH LAB       Drug History:  Home cardiac meds: Aspirin 81 mg once daily, carvedilol 6.25 mg twice daily, evolocumab 140 mg every 14 days, lisinopril 5 mg  daily  Current Outpatient Medications   Medication Sig Dispense Refill    aspirin (ASA) 81 MG chewable tablet Take 1 tablet (81 mg) by mouth daily Starting tomorrow. 30 tablet 3    atorvastatin (LIPITOR) 80 MG tablet Take 1 tablet (80 mg) by mouth daily IMPORTANT-NEED FOLLOW UP FOR FURTHER REFILLS** [phone] 90 tablet 0    carvedilol (COREG) 6.25 MG tablet Take 1 tablet (6.25 mg) by mouth 2 times daily 180 tablet 3    evolocumab (REPATHA) 140 MG/ML prefilled autoinjector Inject 1 mL (140 mg) Subcutaneous every 14 days 6 mL 3    lisinopril (ZESTRIL) 5 MG tablet Take 1 tablet (5 mg) by mouth daily 90 tablet 3    nitroGLYcerin (NITROSTAT) 0.4 MG sublingual tablet For chest pain place 1 tablet under the tongue every 5 minutes for 3 doses. If symptoms persist 5 minutes after 1st dose call 911. 25 tablet 1         Family History:  Mother with premature CAD in 50s     Social History:    Social History     Tobacco Use    Smoking status: Never     Passive exposure: Never    Smokeless tobacco: Never   Substance Use Topics    Alcohol use: Yes     Comment: 3-4 wk       No Known Allergies      Physical Examination:  Vitals: BP (!) 143/99 (BP Location: Right arm, Patient Position: Chair, Cuff Size: Adult Regular)   Pulse 83   Wt 96.1 kg (211 lb 14.4 oz)   SpO2 98%   BMI 30.84 kg/m    BMI= Body mass index is 30.84 kg/m .    GENERAL: Healthy, alert and no distress  Eyes: No xanthelasmas.  NECK: No palpable neck masses/goitre and no evidence of retrosternal goitre.   ENT: Moist mucosal membranes.  RESPIRATORY: No signs of resp distress. Lungs CTAB.  CARDIOVASCULAR:  No JVD, regular, normal S1+S2 without added sounds or murmurs.  ABDOMEN: ND, soft, non-tender, normal bowel sounds.  EXTREMITIES: Warm, well-perfused, no edema.   NEUROLOGY: GCS 15/15, no focal deficits.  VASC: No carotid bruits. Carotid, radial, brachial, popliteal, dorsalis pedis and posterior tibialis pulses are normal in volumes and symmetric bilaterally.    SKIN: No ecchymoses, no rashes.  PSYCH: Cooperative, pleasant affect.       Investigations:  I personally viewed and interpreted the following investigations:    Labs:    CBC RESULTS:  Lab Results   Component Value Date    WBC 5.5 04/20/2023    RBC 4.42 04/20/2023    HGB 14.8 04/20/2023    HCT 43.6 04/20/2023    MCV 99 04/20/2023    MCH 33.5 (H) 04/20/2023    MCHC 33.9 04/20/2023    RDW 12.3 04/20/2023     04/20/2023       CMP RESULTS:  Lab Results   Component Value Date     (L) 04/20/2023    POTASSIUM 4.3 04/20/2023    POTASSIUM 4.7 03/09/2022    CHLORIDE 96 (L) 04/20/2023    CHLORIDE 107 03/09/2022    CO2 22 04/20/2023    CO2 29 03/09/2022    ANIONGAP 14 04/20/2023    ANIONGAP 4 03/09/2022    GLC 95 04/20/2023     (H) 03/09/2022    BUN 19.2 04/20/2023    BUN 20 03/09/2022    CR 1.12 04/20/2023    GFRESTIMATED 77 04/20/2023    DOMINIQUE 10.0 04/20/2023    BILITOTAL 0.9 04/20/2023    ALBUMIN 5.1 04/20/2023    ALBUMIN 4.1 03/09/2022    ALKPHOS 77 04/20/2023     (H) 04/20/2023    AST 80 (H) 04/20/2023          BNP RESULTS:  Lab Results   Component Value Date    NTBNPI 55 12/16/2021       LIPIDS:  Cholesterol   Date Value Ref Range Status   06/07/2024 201 (H) <200 mg/dL Final   04/20/2023 200 (H) <200 mg/dL Final     Direct Measure HDL   Date Value Ref Range Status   06/07/2024 57 >=40 mg/dL Final   04/20/2023 58 >=40 mg/dL Final     LDL Cholesterol Calculated   Date Value Ref Range Status   06/07/2024 81 <=100 mg/dL Final   04/20/2023 118 (H) <=100 mg/dL Final     Triglycerides   Date Value Ref Range Status   06/07/2024 317 (H) <150 mg/dL Final   04/20/2023 120 <150 mg/dL Final       Recent Tests:    Electrocardiogram (5/26/2023):  Normal sinus rhythm, normal axis, normal intervals, no ischemic changes.    Echocardiogram (04/24/2023):  Normal biventricular function without any significant valvular abnormalities        Assessment and Plan:   Thang Cagle is a 59 year old male with a  past medical history of NSTEMI, dyslipidemia, and hypertension presented to me in June 2024 to establish general cardiovascular care.    Mr. Cagle exhibits an excellent functional status.  From a secondary prevention standpoint, I note that he is exhibiting hypertension and has an LDL greater than 70 despite the fact that he is on a PCSK9 inhibitor.  In view of this, I talked him seeing [facility] clinic to see if they can optimize antihypertensive therapy.  I did also increase his lisinopril today and stop his carvedilol to promote better blood pressure control.  He does not appear to have a strong indication of carvedilol given that his LV function is normal and he does not have any anginal symptoms.  From the perspective his lipids, I will review his evolocumab usage.  If his LDL still greater than 70 while on evolocumab, I would recommend that Mr. Cagle  switches his evolocumab to alirocumab or even, perhaps, inclisiran.    Problems:  Coronary artery disease (NSTEMI) status post PCI in mid LAD and mid RCA in 2021   Dyslipidemia  Hypertension    Plan:  - Stop carvedilol  - Increase lisinopril to 10 mg daily  - [facility] clinic  - Continue aspirin 81 mg once daily for CAD  - Continue evolocumab 140 mg every 14 days for dyslipidemia      A total of 40 minutes were spent on the day of the visit for chart review, care coordination, face-to-face consultation with the patient, and documentation.       Arnie Orosco [facility], [place]    Cardiovascular Division  Pager [phone]    CC  Patient Care Team:  No Ref-Primary, Physician as PCP - Elba Frank RN as Specialty Care Coordinator (Cardiology)  Nael Duke MD as Assigned Heart and Vascular Provider  Felipe John DO as Assigned PCP

## 2024-06-07 ENCOUNTER — LAB (OUTPATIENT)
Dept: LAB | Facility: CLINIC | Age: 59
End: 2024-06-07
Attending: STUDENT IN AN ORGANIZED HEALTH CARE EDUCATION/TRAINING PROGRAM
Payer: COMMERCIAL

## 2024-06-07 ENCOUNTER — OFFICE VISIT (OUTPATIENT)
Dept: CARDIOLOGY | Facility: CLINIC | Age: 59
End: 2024-06-07
Attending: STUDENT IN AN ORGANIZED HEALTH CARE EDUCATION/TRAINING PROGRAM
Payer: COMMERCIAL

## 2024-06-07 VITALS
WEIGHT: 211.9 LBS | DIASTOLIC BLOOD PRESSURE: 99 MMHG | SYSTOLIC BLOOD PRESSURE: 143 MMHG | OXYGEN SATURATION: 98 % | HEART RATE: 83 BPM | BODY MASS INDEX: 30.84 KG/M2

## 2024-06-07 DIAGNOSIS — I25.118 CORONARY ARTERY DISEASE OF NATIVE ARTERY OF NATIVE HEART WITH STABLE ANGINA PECTORIS (H): ICD-10-CM

## 2024-06-07 DIAGNOSIS — I10 ESSENTIAL HYPERTENSION: Primary | ICD-10-CM

## 2024-06-07 DIAGNOSIS — E78.5 HYPERLIPIDEMIA, UNSPECIFIED HYPERLIPIDEMIA TYPE: ICD-10-CM

## 2024-06-07 LAB
CHOLEST SERPL-MCNC: 201 MG/DL
FASTING STATUS PATIENT QL REPORTED: YES
HDLC SERPL-MCNC: 57 MG/DL
LDLC SERPL CALC-MCNC: 81 MG/DL
NONHDLC SERPL-MCNC: 144 MG/DL
TRIGL SERPL-MCNC: 317 MG/DL

## 2024-06-07 PROCEDURE — 99213 OFFICE O/P EST LOW 20 MIN: CPT | Performed by: STUDENT IN AN ORGANIZED HEALTH CARE EDUCATION/TRAINING PROGRAM

## 2024-06-07 PROCEDURE — 80061 LIPID PANEL: CPT | Performed by: PATHOLOGY

## 2024-06-07 PROCEDURE — 36415 COLL VENOUS BLD VENIPUNCTURE: CPT | Performed by: PATHOLOGY

## 2024-06-07 PROCEDURE — 99215 OFFICE O/P EST HI 40 MIN: CPT | Performed by: STUDENT IN AN ORGANIZED HEALTH CARE EDUCATION/TRAINING PROGRAM

## 2024-06-07 RX ORDER — LISINOPRIL 10 MG/1
10 TABLET ORAL DAILY
Qty: 90 TABLET | Refills: 3 | Status: SHIPPED | OUTPATIENT
Start: 2024-06-07 | End: 2025-06-02

## 2024-06-07 ASSESSMENT — PAIN SCALES - GENERAL: PAINLEVEL: NO PAIN (0)

## 2024-06-07 NOTE — PATIENT INSTRUCTIONS
You were seen at the [facility] clinic today.   You saw Dr. Arnie Orosco, [facility], [place].    The following is a summary of your office visit today:       Take your medicines every day, as directed     Changes made today:  Change lisinopril to 10 mg daily  Stop carvedilol  Medication Management Clinic        Cardiology Care Coordinator:      Shakira SIDDIQI RN        Phone  [phone]      Fax [phone]    To Contact us     During Business Hours:  [phone]     If you are needing refills please contact your pharmacy.     For urgent after hour care please call the [facility] at [phone] or the [facility] at [phone] and ask to speak to the cardiologist on call.    If you are having a medical emergency, please call 911.            HOW TO CHECK YOUR BLOOD PRESSURE AT HOME:     Avoid eating, smoking, and exercising for at least 30 minutes before taking a reading.     Be sure you have taken your BP medication at least 2-3 hours before you check it.      Sit quietly for 10 minutes before a reading.      Sit in a chair with your feet flat on the floor. Rest your  arm on a table so that the arm cuff is at the same level as your heart.     Remain still during the reading.  Record your blood pressure and pulse in a log and bring to your next appointment.       Use [employer] allows you to communicate directly with your heart team through secure messaging.  Heartbeat can be accessed any time on your phone, computer, or tablet.  If you need assistance, we'd be happy to help!             Keep your Heart Appointments:     Follow up with me in 6 months

## 2024-06-07 NOTE — NURSING NOTE
Chief Complaint   Patient presents with    Follow Up     Reason for visit: Elizabeth F/U    Cardiac Dx: CAD, NSTEMI, s/p stenting, HTN, HLD        Vitals were taken and medications reconciled.    Neymar Pennington, EMT  10:37 AM

## 2024-06-07 NOTE — LETTER
6/7/2024      RE: Thang Cagle  [address]  [facility] [address]       Dear Colleague,    Thank you for the opportunity to participate in the care of your patient, Thang Cagle, at the [facility] [place] [facility]. Please see a copy of my visit note below.            Chief Complaint: Establish general cardiovascular care    HPI (6/7/24): Thang Cagle (pronounced '[id number]') is a 59 year old male with a past medical history of coronary artery disease (NSTEMI with prior PCI to RCA and LAD) who was referred to me to establish general cardiovascular care.  He was last seen by my colleague, Dr. Carlos Johnson, in May 2023.    Briefly, Mr. Cagle came to the attention of cardiology in 2021, when he presented with chest pain. He went on to have a coronary angiogram, with lesions identified in the mid LAD and mid-distal RCA. Both were stented. He has since followed up with Dr. Johnson and has been devoid of symptoms.      Today, Mr. Cagle notes that he feels well. His chief complaint is MSK joint pain in the hip.  He notes that he has some difficulty taking the second dose of Coreg in the evening.  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  At the time of the consultation, he notes an absence of chest pain at rest, dyspnea at rest or with exertion, PND, orthopnea, peripheral edema, palpitations, lightheadedness or syncope.     A comprehensive ROS was done and the details are included above in the HPI.    Past Medical History:  Coronary artery disease (NSTEMI with prior PCI to RCA and LAD in 2021)  Dyslipidemia  Hypertension    Past Surgical History:    Past Surgical History:   Procedure Laterality Date     CV HEART CATHETERIZATION WITH POSSIBLE INTERVENTION N/A 12/17/2021    Procedure: Heart Catheterization with Possible Intervention;  Surgeon: Cade Nicholson MD;   Location:  HEART CARDIAC CATH LAB     CV PCI ANGIOPLASTY N/A 12/17/2021    Procedure: Percutaneous Transluminal Angioplasty;  Surgeon: Cade Nicholson MD;  Location: [facility] CARDIAC CATH LAB       Drug History:  Home cardiac meds: Aspirin 81 mg once daily, carvedilol 6.25 mg twice daily, evolocumab 140 mg every 14 days, lisinopril 5 mg daily  Current Outpatient Medications   Medication Sig Dispense Refill     aspirin (ASA) 81 MG chewable tablet Take 1 tablet (81 mg) by mouth daily Starting tomorrow. 30 tablet 3     atorvastatin (LIPITOR) 80 MG tablet Take 1 tablet (80 mg) by mouth daily IMPORTANT-NEED FOLLOW UP FOR FURTHER REFILLS** [phone] 90 tablet 0     carvedilol (COREG) 6.25 MG tablet Take 1 tablet (6.25 mg) by mouth 2 times daily 180 tablet 3     evolocumab (REPATHA) 140 MG/ML prefilled autoinjector Inject 1 mL (140 mg) Subcutaneous every 14 days 6 mL 3     lisinopril (ZESTRIL) 5 MG tablet Take 1 tablet (5 mg) by mouth daily 90 tablet 3     nitroGLYcerin (NITROSTAT) 0.4 MG sublingual tablet For chest pain place 1 tablet under the tongue every 5 minutes for 3 doses. If symptoms persist 5 minutes after 1st dose call 911. 25 tablet 1         Family History:  Mother with premature CAD in 50s     Social History:    Social History     Tobacco Use     Smoking status: Never     Passive exposure: Never     Smokeless tobacco: Never   Substance Use Topics     Alcohol use: Yes     Comment: 3-4 wk       No Known Allergies      Physical Examination:  Vitals: BP (!) 143/99 (BP Location: Right arm, Patient Position: Chair, Cuff Size: Adult Regular)   Pulse 83   Wt 96.1 kg (211 lb 14.4 oz)   SpO2 98%   BMI 30.84 kg/m    BMI= Body mass index is 30.84 kg/m .    GENERAL: Healthy, alert and no distress  Eyes: No xanthelasmas.  NECK: No palpable neck masses/goitre and no evidence of retrosternal goitre.   ENT: Moist mucosal membranes.  RESPIRATORY: No signs of resp distress. Lungs CTAB.  CARDIOVASCULAR:   No JVD, regular, normal S1+S2 without added sounds or murmurs.  ABDOMEN: ND, soft, non-tender, normal bowel sounds.  EXTREMITIES: Warm, well-perfused, no edema.   NEUROLOGY: GCS 15/15, no focal deficits.  VASC: No carotid bruits. Carotid, radial, brachial, popliteal, dorsalis pedis and posterior tibialis pulses are normal in volumes and symmetric bilaterally.   SKIN: No ecchymoses, no rashes.  PSYCH: Cooperative, pleasant affect.       Investigations:  I personally viewed and interpreted the following investigations:    Labs:    CBC RESULTS:  Lab Results   Component Value Date    WBC 5.5 04/20/2023    RBC 4.42 04/20/2023    HGB 14.8 04/20/2023    HCT 43.6 04/20/2023    MCV 99 04/20/2023    MCH 33.5 (H) 04/20/2023    MCHC 33.9 04/20/2023    RDW 12.3 04/20/2023     04/20/2023       CMP RESULTS:  Lab Results   Component Value Date     (L) 04/20/2023    POTASSIUM 4.3 04/20/2023    POTASSIUM 4.7 03/09/2022    CHLORIDE 96 (L) 04/20/2023    CHLORIDE 107 03/09/2022    CO2 22 04/20/2023    CO2 29 03/09/2022    ANIONGAP 14 04/20/2023    ANIONGAP 4 03/09/2022    GLC 95 04/20/2023     (H) 03/09/2022    BUN 19.2 04/20/2023    BUN 20 03/09/2022    CR 1.12 04/20/2023    GFRESTIMATED 77 04/20/2023    DOMINIQUE 10.0 04/20/2023    BILITOTAL 0.9 04/20/2023    ALBUMIN 5.1 04/20/2023    ALBUMIN 4.1 03/09/2022    ALKPHOS 77 04/20/2023     (H) 04/20/2023    AST 80 (H) 04/20/2023          BNP RESULTS:  Lab Results   Component Value Date    NTBNPI 55 12/16/2021       LIPIDS:  Cholesterol   Date Value Ref Range Status   06/07/2024 201 (H) <200 mg/dL Final   04/20/2023 200 (H) <200 mg/dL Final     Direct Measure HDL   Date Value Ref Range Status   06/07/2024 57 >=40 mg/dL Final   04/20/2023 58 >=40 mg/dL Final     LDL Cholesterol Calculated   Date Value Ref Range Status   06/07/2024 81 <=100 mg/dL Final   04/20/2023 118 (H) <=100 mg/dL Final     Triglycerides   Date Value Ref Range Status   06/07/2024 317 (H) <150 mg/dL  Final   04/20/2023 120 <150 mg/dL Final       Recent Tests:    Electrocardiogram (5/26/2023):  Normal sinus rhythm, normal axis, normal intervals, no ischemic changes.    Echocardiogram (04/24/2023):  Normal biventricular function without any significant valvular abnormalities        Assessment and Plan:   Thang Cagle is a 59 year old male with a past medical history of NSTEMI, dyslipidemia, and hypertension presented to me in June 2024 to establish general cardiovascular care.    Mr. Cagle exhibits an excellent functional status.  From a secondary prevention standpoint, I note that he is exhibiting hypertension and has an LDL greater than 70 despite the fact that he is on a PCSK9 inhibitor.  In view of this, I talked him seeing [facility] clinic to see if they can optimize antihypertensive therapy.  I did also increase his lisinopril today and stop his carvedilol to promote better blood pressure control.  He does not appear to have a strong indication of carvedilol given that his LV function is normal and he does not have any anginal symptoms.  From the perspective his lipids, I will review his evolocumab usage.  If his LDL still greater than 70 while on evolocumab, I would recommend that Mr. Cagle  switches his evolocumab to alirocumab or even, perhaps, inclisiran.    Problems:  Coronary artery disease (NSTEMI) status post PCI in mid LAD and mid RCA in 2021   Dyslipidemia  Hypertension    Plan:  - Stop carvedilol  - Increase lisinopril to 10 mg daily  - [facility] clinic  - Continue aspirin 81 mg once daily for CAD  - Continue evolocumab 140 mg every 14 days for dyslipidemia      A total of 40 minutes were spent on the day of the visit for chart review, care coordination, face-to-face consultation with the patient, and documentation.       Arnie Orosco, [facility], [place]    Cardiovascular Division  Pager [phone]    CC  Patient Care Team:  No Ref-Primary, Physician as PCP - Elba Frank  RN as Specialty Care Coordinator (Cardiology)  Nael Duke MD as Assigned Heart and Vascular Provider  Felipe John DO as Assigned PCP      Please do not hesitate to contact me if you have any questions/concerns.     Sincerely,     Arnie Orosco MD

## 2024-06-10 ENCOUNTER — TELEPHONE (OUTPATIENT)
Dept: CARDIOLOGY | Facility: CLINIC | Age: 59
End: 2024-06-10
Payer: COMMERCIAL

## 2024-06-10 DIAGNOSIS — I25.118 CORONARY ARTERY DISEASE OF NATIVE ARTERY OF NATIVE HEART WITH STABLE ANGINA PECTORIS (H): ICD-10-CM

## 2024-06-10 DIAGNOSIS — E78.5 HYPERLIPIDEMIA, UNSPECIFIED HYPERLIPIDEMIA TYPE: ICD-10-CM

## 2024-06-10 NOTE — TELEPHONE ENCOUNTER
"MTM referral from: [place] clinic visit (referral by provider)    MTM referral outreach attempt #1 on Shital 10, 2024 at 10:21 AM      Outcome: Patient is not interested at this time because MTM is not covered by his insurance. Patient states\" someone just needs to make a decision about the medication and let me know\"     Use private pay for the carrier/Plan on the flowsheet        Irish Salinas - MTM    [phone]      "

## 2024-06-24 ENCOUNTER — TELEPHONE (OUTPATIENT)
Dept: CARDIOLOGY | Facility: CLINIC | Age: 59
End: 2024-06-24
Payer: COMMERCIAL

## 2024-06-24 NOTE — TELEPHONE ENCOUNTER
Central Prior Authorization Team   Phone: [phone]    PA Initiation    Medication: Repatha 140mg/ml  Insurance Company: [employer] - Phone [phone] Fax [phone]  Pharmacy Filling the Rx: [facility] - [place], [place] - [address]  Filling Pharmacy Phone: [phone]  Filling Pharmacy Fax:    Start Date: 6/24/2024    Finished through [employer]

## 2024-06-24 NOTE — TELEPHONE ENCOUNTER
Called pt to go over blood pressures. Pt states his BP's have been 118-130's/80's-90.     Pt reports he is also not taking repatha and that he never started it. With recent cholesterol numbers, recommend restarting it. New order placed and PA started.     Pt had no further questions and is open to starting medication.     Will recheck labs in 3 months.

## 2024-06-26 NOTE — TELEPHONE ENCOUNTER
Prior Authorization Approval    Authorization Effective Date: 6/24/2024  Authorization Expiration Date: 6/24/2025  Medication: Repatha 140mg/ml  Approved Dose/Quantity:   Reference #:     Insurance Company: [employer] - Phone [phone] Fax [phone]  Expected CoPay:       CoPay Card Available:      Foundation Assistance Needed:    Which Pharmacy is filling the prescription (Not needed for infusion/clinic administered): [facility] - [place], [place] - [address] [place] [address]  Pharmacy Notified:  yes  Patient Notified:  yes- Pharmacy will contact patient when ready to /ship

## 2024-07-21 ENCOUNTER — HEALTH MAINTENANCE LETTER (OUTPATIENT)
Age: 59
End: 2024-07-21

## 2024-10-13 DIAGNOSIS — I25.118 CORONARY ARTERY DISEASE OF NATIVE ARTERY OF NATIVE HEART WITH STABLE ANGINA PECTORIS (H): ICD-10-CM

## 2024-10-16 RX ORDER — ATORVASTATIN CALCIUM 80 MG/1
80 TABLET, FILM COATED ORAL DAILY
Qty: 90 TABLET | Refills: 2 | Status: SHIPPED | OUTPATIENT
Start: 2024-10-16

## 2024-10-16 NOTE — TELEPHONE ENCOUNTER
LVD:  6/7/2024  [facility] Arnie Ruvalcaba MD  Cardiovascular Disease   NVD Sheri 12/2024  LDL Cholesterol Calculated   Date Value Ref Range Status   06/07/2024 81 <=100 mg/dL Final       Refilled per protocol.

## 2024-12-04 NOTE — PROGRESS NOTES
[facility] Cardiology - [facility]   Cardiology Clinic Note      HPI:   Mr. Thang Cagle is a pleasant 59 year old male with medical history pertinent for CAD with NSTEMI /p PCI to RCA and LAD (), HTN, and HLD. He presents to cardiology clinic for follow up.    Thang was most recently seen in cardiology clinic in 2024 by Dr. Orosco. Carvedilol was stopped and lisinopril increased to 10mg daily and Repatha initiated    Jose has been experiencing leg cramps at the end of the day for the past 4 months, resolved in the morning. He also notes more diffuse body cramps, wonders if it's from an old hockey injury. Home systolic blood pressures have been 140-150s; he takes lisinopril in AM and often checks BP in the evening.   He has not started taking Reptha, was unsure if it was prescribed or just discussed at his prevous cardiology visit.  He had a significant family history of early on set CAD (mother with heart attack in her 50s, grandfather  of cardiac arrest in his 40s).    PAST MEDICAL HISTORY:  No past medical history on file.    FAMILY HISTORY:  No family history on file.    SOCIAL HISTORY:  Social History     Socioeconomic History    Marital status: Single   Tobacco Use    Smoking status: Never     Passive exposure: Never    Smokeless tobacco: Never   Vaping Use    Vaping status: Never Used   Substance and Sexual Activity    Alcohol use: Yes     Comment: 3-4 wk    Drug use: No    Sexual activity: Yes     Partners: Female       CURRENT MEDICATIONS:  Current Outpatient Medications   Medication Sig Dispense Refill    aspirin (ASA) 81 MG chewable tablet Take 1 tablet (81 mg) by mouth daily Starting tomorrow. 30 tablet 3    atorvastatin (LIPITOR) 80 MG tablet Take 1 tablet (80 mg) by mouth daily. 90 tablet 2    evolocumab (REPATHA) 140 MG/ML prefilled autoinjector Inject 1 mL (140 mg) Subcutaneous every 14 days 6 mL 3    lisinopril (ZESTRIL) 10 MG tablet Take 1 tablet (10 mg) by mouth daily for 360  days 90 tablet 3    nitroGLYcerin (NITROSTAT) 0.4 MG sublingual tablet For chest pain place 1 tablet under the tongue every 5 minutes for 3 doses. If symptoms persist 5 minutes after 1st dose call 911. 25 tablet 1     No current facility-administered medications for this visit.       ROS:   Refer to HPI    EXAM:  BP (!) 144/104 (BP Location: Left arm, Patient Position: Chair, Cuff Size: Adult Regular)   Pulse 103   Wt 99.4 kg (219 lb 3.2 oz)   SpO2 94%   BMI 31.91 kg/m    GENERAL: Appears comfortable, in no acute distress.   HEENT: Eye symmetrical, no discharge or icterus bilaterally. Mucous membranes moist and without lesions.  CV: RRR, +S1S2, no murmur, rub, or gallop.   RESPIRATORY: Respirations regular, even, and unlabored. Lungs CTA throughout.   GI: Soft and non distended with normoactive bowel sounds present in all quadrants. No tenderness, rebound, guarding.   EXTREMITIES: no peripheral edema. 2+ bilateral pedal pulses.   NEUROLOGIC: Alert and oriented x 3. No focal deficits.   MUSCULOSKELETAL: No joint swelling or tenderness.   SKIN: No jaundice. No rashes or lesions.     Labs, reviewed with patient in clinic today:  CBC RESULTS:  Lab Results   Component Value Date    WBC 5.5 04/20/2023    RBC 4.42 04/20/2023    HGB 14.8 04/20/2023    HCT 43.6 04/20/2023    MCV 99 04/20/2023    MCH 33.5 (H) 04/20/2023    MCHC 33.9 04/20/2023    RDW 12.3 04/20/2023     04/20/2023       CMP RESULTS:  Lab Results   Component Value Date     (L) 04/20/2023    POTASSIUM 4.3 04/20/2023    POTASSIUM 4.7 03/09/2022    CHLORIDE 96 (L) 04/20/2023    CHLORIDE 107 03/09/2022    CO2 22 04/20/2023    CO2 29 03/09/2022    ANIONGAP 14 04/20/2023    ANIONGAP 4 03/09/2022    GLC 95 04/20/2023     (H) 03/09/2022    BUN 19.2 04/20/2023    BUN 20 03/09/2022    CR 1.12 04/20/2023    GFRESTIMATED 77 04/20/2023    DOMINIQUE 10.0 04/20/2023    BILITOTAL 0.9 04/20/2023    ALBUMIN 5.1 04/20/2023    ALBUMIN 4.1 03/09/2022    ALKPHOS  "77 04/20/2023     (H) 04/20/2023    AST 80 (H) 04/20/2023        INR RESULTS:  No results found for: \"INR\"    No results found for: \"MAG\"  Lab Results   Component Value Date    NTBNPI 55 12/16/2021     No results found for: \"NTBNP\"    LIPIDS:  Recent Labs   Lab Test 06/07/24  0952 04/20/23  1340   CHOL 201* 200*   HDL 57 58   LDL 81 118*   TRIG 317* 120         Echocardiogram 2023  Interpretation Summary  Left ventricular size, wall motion and function are normal. The ejection  fraction is 60-65%.  Right ventricular function, chamber size, wall motion, and thickness are  normal.  All valves are normal.  The inferior vena cava was normal in size with preserved respiratory  variability.No pericardial effusion is present.     No significant changes noted.    Coronary Angiogram: 12/2021:    Conclusion    NSTEMI  Two vessel severe CAD involving the mid LAD and mid to distal RCA with the mid RCA lesion culprit in MI. Otherwise, mild non obstructive CAD elsewhere.  PCI with two drug eluting stents to the RCA and one drug eluting stent to the mid LAD.  Ventricular fibrillation x1 defibrillation.      Plan     Follow bedrest per protocol   Continued medical management and lifestyle modifications for cardiovascular risk factor optimizations.   Arterial sheath removed from radial artery with TR band placement.   Cardiac rehabilitation.   Return to the primary inpatient team for further evaluation and managmenet       Continuation of dual antiplatelet therapy for 12 months   Post antiplatelet therapy of    Aspirin; give 81 mg qd .     Ticagrelor; and 90 mg BID.     Continue high dose statin therapy     Coronary Findings    Diagnostic  Dominance: Right  Left Main   The vessel was visualized by selective angiography, is moderate in size and is angiographically normal. There was 0% vessel disease.      Left Anterior Descending   The vessel is moderate in size.   Mid LAD-1 lesion is 80% stenosed.   Mid LAD-2 lesion is 70% " stenosed.      First Diagonal Branch   The vessel is moderate in size and is angiographically normal.      Second Diagonal Branch   The vessel is small and is angiographically normal.      Left Circumflex   The vessel is moderate in size.      First Obtuse Marginal Branch   The vessel is large and is angiographically normal.   1st Mrg lesion is 50% stenosed.      Right Coronary Artery   The vessel was visualized by selective angiography and is moderate in size.   Mid RCA lesion is 99% stenosed.      Right Posterior Descending Artery   The vessel is moderate in size and is angiographically normal.      Right Posterior Atrioventricular Artery   The vessel is moderate in size and is angiographically normal.      First Right Posterolateral Branch   The vessel is moderate in size and is angiographically normal.         Intervention     Mid LAD-1 lesion   Stent (Also treats lesions: Mid LAD-2)   Lesion length: 16 mm. The pre-interventional distal flow is normal (RODERICK 3). A stent was successfully placed. The post-interventional distal flow is normal (RODERICK 3). The left main was engaged using a 6 [place]ian XB 3.5 guide catheter. Heparin was used for anticoagulation, maintaining ACT greater than 250 seconds throughout the procedure. A run-through coronary guidewire was advanced into the distal LAD. A 2.5 x 20 mm Emerge semicompliant balloon was used to predilate the lesion in the mid-LAD. A 2.75 x 28 mm Synergy XD drug-eluting stent was deployed in the mid-LAD.The stent was postdilated using a non-compliant Emerge 2.75 x 12 mm non-compliant balloon. The proximal segment of the stent was post dilated further with a 3.0x15mm NC Emerge. Follow up angiogram revealed the stent was fully expanded and well apposed, there was no evidence of dissection, perforation, or distal thrombus and there was RODERICK-3 flow.   There is a 0% residual stenosis post intervention.      Mid LAD-2 lesion   Stent (Also treats lesions: Mid LAD-1)   See  details in Mid LAD-1 lesion.   There is a 0% residual stenosis post intervention.      Mid RCA lesion   Stent   Lesion length: 15 mm. The pre-interventional distal flow is decreased (RODERICK 2). A stent was successfully placed. The post-interventional distal flow is normal (RODERICK 3). The right coronary artery was engaged using a 6 [place] JR5 guide catheter. Heparin was used for anticoagulation, maintaining ACT greater than 250 seconds throughout the procedure. A run-through coronary guidewire was advanced into the distal RCA. A 2.5 x 20 mm Emerge semicompliant balloon was used to predilate the lesion in the mid-RCA. A 2.75 x 28 mm Synergy XD drug-eluting stent was deployed in the mid-RCA. Shortly after, the patient went into ventricular fibrillation likely due to jailing of an RV marginal branch, and he was successfully defibrillated into normal sinus rhythm. No CPR was needed and BP was 140/80 after the shock. After the shock, the guide was disengaged and an AL0.75 was used to re-engage the RCA. A runthrough wire was advanced to the distal RCA. The mid-RCA stent was postdilated using a 3 x 15 mm non-compliant Emerge balloon. Distal to the stent, a 70% stenosis was still present so it was predilated using a 3 x 15 mm non-compliant Emerge balloon. A 2.75 x 12 mm Synergy XD drug eluting stent was deployed distal to the mid-RCA stent. Follow up angiogram revealed the stents were fully expanded and well apposed, there was no evidence of dissection, perforation, or distal thrombus and there was RODERICK-3 flow.   Stent   Lesion length: 7 mm. The pre-interventional distal flow is decreased (RODERICK 2). A stent was successfully placed. The post-interventional distal flow is normal (RODERICK 3). See procedure details above   There is a 0% residual stenosis post intervention.               Assessment and Plan:   Mr. Cagle is a 59 year old male with a PMH of Mr. Thang Cagle is a pleasant 59 year old male with medical history  pertinent for CAD with NSTEMI s/p PCI to RCA and LAD (2021), HTN, and HLD.    # Coronary artery disease with NSTEMI s/p PCI to LAD and RCA (2021)  # HLD  Coronary angiogram 12/2021: showing severe 2v CAD in mLAD and mid-distal RCA (culprit lesion) s/p  PCI with MICKY x2 to RCA (2.75 x 28 mm Synergy XD, 2.75 x 12 mm Synergy XD)  and MICKY x1 to LAD (2.75 x 28 mm Synergy XD). Most recent LDL 81, goal <70   - aspirin 81mg daily  - continue atorvastatin 80mg daily   -start Repatha   - recheck fasting lipid panel in 2 months     # HTN  Hypertensive in clinic, reports home systolic -150s  - increase lisinopril to 20mg daily  - RN to call for BP check in 2 weeks    # Myalgias  Reports new myalgias the past 4 months, suspect related to statin use. Discussed stating PCSK9i with the goal of down titrating or stopping statin.   - start Repatha 140mg q14 days  - cont atorvastatin 80mg daliy  - FLP in 2 months     # MARIELA  Has never used CPAP or other assistive devices, wakes up q30 minutes most nights.   - sleep medicine referral & sleep study ordered   - PCP referral to establish care    Follow up:  6 months  Chart review time today: 10 minutes  Visit time today: 20 minutes  Total time spent today: 30 minutes        Liana Wade CNP  General Cardiology   12/09/24

## 2024-12-09 ENCOUNTER — OFFICE VISIT (OUTPATIENT)
Dept: CARDIOLOGY | Facility: CLINIC | Age: 59
End: 2024-12-09
Attending: CASE MANAGER/CARE COORDINATOR
Payer: COMMERCIAL

## 2024-12-09 VITALS
BODY MASS INDEX: 31.91 KG/M2 | OXYGEN SATURATION: 94 % | SYSTOLIC BLOOD PRESSURE: 144 MMHG | DIASTOLIC BLOOD PRESSURE: 104 MMHG | HEART RATE: 103 BPM | WEIGHT: 219.2 LBS

## 2024-12-09 DIAGNOSIS — I10 BENIGN ESSENTIAL HYPERTENSION: ICD-10-CM

## 2024-12-09 DIAGNOSIS — E78.5 HYPERLIPIDEMIA, UNSPECIFIED HYPERLIPIDEMIA TYPE: ICD-10-CM

## 2024-12-09 DIAGNOSIS — G47.33 OSA (OBSTRUCTIVE SLEEP APNEA): ICD-10-CM

## 2024-12-09 DIAGNOSIS — I25.118 CORONARY ARTERY DISEASE OF NATIVE ARTERY OF NATIVE HEART WITH STABLE ANGINA PECTORIS (H): Primary | ICD-10-CM

## 2024-12-09 PROCEDURE — 99213 OFFICE O/P EST LOW 20 MIN: CPT | Performed by: CASE MANAGER/CARE COORDINATOR

## 2024-12-09 PROCEDURE — 99214 OFFICE O/P EST MOD 30 MIN: CPT | Performed by: CASE MANAGER/CARE COORDINATOR

## 2024-12-09 RX ORDER — LISINOPRIL 20 MG/1
20 TABLET ORAL DAILY
Qty: 90 TABLET | Refills: 1 | Status: SHIPPED | OUTPATIENT
Start: 2024-12-09

## 2024-12-09 ASSESSMENT — PAIN SCALES - GENERAL: PAINLEVEL_OUTOF10: NO PAIN (0)

## 2024-12-09 NOTE — NURSING NOTE
Chief Complaint   Patient presents with    Follow Up     Return Cardiology- 6 month follow up for Dr. Orosco     Vitals were taken and medications reconciled.    Chaparro Raza, GLEN  9:40 AM

## 2024-12-09 NOTE — PATIENT INSTRUCTIONS
You were seen today in the Cardiovascular Clinic at the [facility] by:       DEBORAH RICH CNP    Your visit summary and instructions are as follows:    Increase lisinopril to 20mg daily  Start Repatha injectable cholesterol medicine every 2 weeks  Recheck fasting cholesterol labs in 2 months   Sleep study & Primary care referral       Return to cardiology clinic in 6 months      Thank you for your visit today!     Please MyChart message me or call my nurse if you have any questions or concerns.      During Business Hours:  [phone], option # 1 (University) then option # 4 (medical questions) and ask to speak with my nurse.     After hours, weekends or holidays:   [phone], Option #4  Ask to speak to the On-Call Cardiologist. Inform them you are a cardiology patient at the [place].

## 2024-12-09 NOTE — LETTER
2024      RE: Thang Cagle  [address]  [facility] [address]       Dear Colleague,    Thank you for the opportunity to participate in the care of your patient, Thang Cagle, at the [facility] [place] at [facility]. Please see a copy of my visit note below.      [facility] Cardiology - [facility] [place]   Cardiology Clinic Note      HPI:   Mr. Thang Cagle is a pleasant 59 year old male with medical history pertinent for CAD with NSTEMI /p PCI to RCA and LAD (), HTN, and HLD. He presents to cardiology clinic for follow up.    Thang was most recently seen in cardiology clinic in 2024 by Dr. Orosco. Carvedilol was stopped and lisinopril increased to 10mg daily and Repatha initiated    Jose has been experiencing leg cramps at the end of the day for the past 4 months, resolved in the morning. He also notes more diffuse body cramps, wonders if it's from an old hockey injury. Home systolic blood pressures have been 140-150s; he takes lisinopril in AM and often checks BP in the evening.   He has not started taking Reptha, was unsure if it was prescribed or just discussed at his prevous cardiology visit.  He had a significant family history of early on set CAD (mother with heart attack in her 50s, grandfather  of cardiac arrest in his 40s).    PAST MEDICAL HISTORY:  No past medical history on file.    FAMILY HISTORY:  No family history on file.    SOCIAL HISTORY:  Social History     Socioeconomic History     Marital status: Single   Tobacco Use     Smoking status: Never     Passive exposure: Never     Smokeless tobacco: Never   Vaping Use     Vaping status: Never Used   Substance and Sexual Activity     Alcohol use: Yes     Comment: 3-4 wk     Drug use: No     Sexual activity: Yes     Partners: Female       CURRENT MEDICATIONS:  Current Outpatient Medications   Medication Sig Dispense Refill     aspirin (ASA) 81 MG  chewable tablet Take 1 tablet (81 mg) by mouth daily Starting tomorrow. 30 tablet 3     atorvastatin (LIPITOR) 80 MG tablet Take 1 tablet (80 mg) by mouth daily. 90 tablet 2     evolocumab (REPATHA) 140 MG/ML prefilled autoinjector Inject 1 mL (140 mg) Subcutaneous every 14 days 6 mL 3     lisinopril (ZESTRIL) 10 MG tablet Take 1 tablet (10 mg) by mouth daily for 360 days 90 tablet 3     nitroGLYcerin (NITROSTAT) 0.4 MG sublingual tablet For chest pain place 1 tablet under the tongue every 5 minutes for 3 doses. If symptoms persist 5 minutes after 1st dose call 911. 25 tablet 1     No current facility-administered medications for this visit.       ROS:   Refer to HPI    EXAM:  BP (!) 144/104 (BP Location: Left arm, Patient Position: Chair, Cuff Size: Adult Regular)   Pulse 103   Wt 99.4 kg (219 lb 3.2 oz)   SpO2 94%   BMI 31.91 kg/m    GENERAL: Appears comfortable, in no acute distress.   HEENT: Eye symmetrical, no discharge or icterus bilaterally. Mucous membranes moist and without lesions.  CV: RRR, +S1S2, no murmur, rub, or gallop.   RESPIRATORY: Respirations regular, even, and unlabored. Lungs CTA throughout.   GI: Soft and non distended with normoactive bowel sounds present in all quadrants. No tenderness, rebound, guarding.   EXTREMITIES: no peripheral edema. 2+ bilateral pedal pulses.   NEUROLOGIC: Alert and oriented x 3. No focal deficits.   MUSCULOSKELETAL: No joint swelling or tenderness.   SKIN: No jaundice. No rashes or lesions.     Labs, reviewed with patient in clinic today:  CBC RESULTS:  Lab Results   Component Value Date    WBC 5.5 04/20/2023    RBC 4.42 04/20/2023    HGB 14.8 04/20/2023    HCT 43.6 04/20/2023    MCV 99 04/20/2023    MCH 33.5 (H) 04/20/2023    MCHC 33.9 04/20/2023    RDW 12.3 04/20/2023     04/20/2023       CMP RESULTS:  Lab Results   Component Value Date     (L) 04/20/2023    POTASSIUM 4.3 04/20/2023    POTASSIUM 4.7 03/09/2022    CHLORIDE 96 (L) 04/20/2023     "CHLORIDE 107 03/09/2022    CO2 22 04/20/2023    CO2 29 03/09/2022    ANIONGAP 14 04/20/2023    ANIONGAP 4 03/09/2022    GLC 95 04/20/2023     (H) 03/09/2022    BUN 19.2 04/20/2023    BUN 20 03/09/2022    CR 1.12 04/20/2023    GFRESTIMATED 77 04/20/2023    DOMINIQUE 10.0 04/20/2023    BILITOTAL 0.9 04/20/2023    ALBUMIN 5.1 04/20/2023    ALBUMIN 4.1 03/09/2022    ALKPHOS 77 04/20/2023     (H) 04/20/2023    AST 80 (H) 04/20/2023        INR RESULTS:  No results found for: \"INR\"    No results found for: \"MAG\"  Lab Results   Component Value Date    NTBNPI 55 12/16/2021     No results found for: \"NTBNP\"    LIPIDS:  Recent Labs   Lab Test 06/07/24  0952 04/20/23  1340   CHOL 201* 200*   HDL 57 58   LDL 81 118*   TRIG 317* 120         Echocardiogram 2023  Interpretation Summary  Left ventricular size, wall motion and function are normal. The ejection  fraction is 60-65%.  Right ventricular function, chamber size, wall motion, and thickness are  normal.  All valves are normal.  The inferior vena cava was normal in size with preserved respiratory  variability.No pericardial effusion is present.     No significant changes noted.    Coronary Angiogram: 12/2021:    Conclusion    NSTEMI  Two vessel severe CAD involving the mid LAD and mid to distal RCA with the mid RCA lesion culprit in MI. Otherwise, mild non obstructive CAD elsewhere.  PCI with two drug eluting stents to the RCA and one drug eluting stent to the mid LAD.  Ventricular fibrillation x1 defibrillation.      Plan      Follow bedrest per protocol    Continued medical management and lifestyle modifications for cardiovascular risk factor optimizations.    Arterial sheath removed from radial artery with TR band placement.    Cardiac rehabilitation.    Return to the primary inpatient team for further evaluation and managmenet        Continuation of dual antiplatelet therapy for 12 months   Post antiplatelet therapy of    Aspirin; give 81 mg qd .     Ticagrelor; " and 90 mg BID.      Continue high dose statin therapy     Coronary Findings    Diagnostic  Dominance: Right  Left Main   The vessel was visualized by selective angiography, is moderate in size and is angiographically normal. There was 0% vessel disease.      Left Anterior Descending   The vessel is moderate in size.   Mid LAD-1 lesion is 80% stenosed.   Mid LAD-2 lesion is 70% stenosed.      First Diagonal Branch   The vessel is moderate in size and is angiographically normal.      Second Diagonal Branch   The vessel is small and is angiographically normal.      Left Circumflex   The vessel is moderate in size.      First Obtuse Marginal Branch   The vessel is large and is angiographically normal.   1st Mrg lesion is 50% stenosed.      Right Coronary Artery   The vessel was visualized by selective angiography and is moderate in size.   Mid RCA lesion is 99% stenosed.      Right Posterior Descending Artery   The vessel is moderate in size and is angiographically normal.      Right Posterior Atrioventricular Artery   The vessel is moderate in size and is angiographically normal.      First Right Posterolateral Branch   The vessel is moderate in size and is angiographically normal.         Intervention     Mid LAD-1 lesion   Stent (Also treats lesions: Mid LAD-2)   Lesion length: 16 mm. The pre-interventional distal flow is normal (RODERICK 3). A stent was successfully placed. The post-interventional distal flow is normal (RODERICK 3). The left main was engaged using a 6 [place] XB 3.5 guide catheter. Heparin was used for anticoagulation, maintaining ACT greater than 250 seconds throughout the procedure. A run-through coronary guidewire was advanced into the distal LAD. A 2.5 x 20 mm Emerge semicompliant balloon was used to predilate the lesion in the mid-LAD. A 2.75 x 28 mm Synergy XD drug-eluting stent was deployed in the mid-LAD.The stent was postdilated using a non-compliant Emerge 2.75 x 12 mm non-compliant balloon. The  proximal segment of the stent was post dilated further with a 3.0x15mm NC Emerge. Follow up angiogram revealed the stent was fully expanded and well apposed, there was no evidence of dissection, perforation, or distal thrombus and there was RODERICK-3 flow.   There is a 0% residual stenosis post intervention.      Mid LAD-2 lesion   Stent (Also treats lesions: Mid LAD-1)   See details in Mid LAD-1 lesion.   There is a 0% residual stenosis post intervention.      Mid RCA lesion   Stent   Lesion length: 15 mm. The pre-interventional distal flow is decreased (RODERICK 2). A stent was successfully placed. The post-interventional distal flow is normal (RODERICK 3). The right coronary artery was engaged using a 6 Bulgarian JR5 guide catheter. Heparin was used for anticoagulation, maintaining ACT greater than 250 seconds throughout the procedure. A run-through coronary guidewire was advanced into the distal RCA. A 2.5 x 20 mm Emerge semicompliant balloon was used to predilate the lesion in the mid-RCA. A 2.75 x 28 mm Synergy XD drug-eluting stent was deployed in the mid-RCA. Shortly after, the patient went into ventricular fibrillation likely due to jailing of an RV marginal branch, and he was successfully defibrillated into normal sinus rhythm. No CPR was needed and BP was 140/80 after the shock. After the shock, the guide was disengaged and an AL0.75 was used to re-engage the RCA. A runthrough wire was advanced to the distal RCA. The mid-RCA stent was postdilated using a 3 x 15 mm non-compliant Emerge balloon. Distal to the stent, a 70% stenosis was still present so it was predilated using a 3 x 15 mm non-compliant Emerge balloon. A 2.75 x 12 mm Synergy XD drug eluting stent was deployed distal to the mid-RCA stent. Follow up angiogram revealed the stents were fully expanded and well apposed, there was no evidence of dissection, perforation, or distal thrombus and there was RODERICK-3 flow.   Stent   Lesion length: 7 mm. The  pre-interventional distal flow is decreased (RODERICK 2). A stent was successfully placed. The post-interventional distal flow is normal (RODERICK 3). See procedure details above   There is a 0% residual stenosis post intervention.               Assessment and Plan:   Mr. Cagle is a 59 year old male with a PMH of MrAndrew Cagle is a pleasant 59 year old male with medical history pertinent for CAD with NSTEMI s/p PCI to RCA and LAD (2021), HTN, and HLD.    # Coronary artery disease with NSTEMI s/p PCI to LAD and RCA (2021)  # HLD  Coronary angiogram 12/2021: showing severe 2v CAD in mLAD and mid-distal RCA (culprit lesion) s/p  PCI with MICKY x2 to RCA (2.75 x 28 mm Synergy XD, 2.75 x 12 mm Synergy XD)  and MICKY x1 to LAD (2.75 x 28 mm Synergy XD). Most recent LDL 81, goal <70   - aspirin 81mg daily  - continue atorvastatin 80mg daily   -start Repatha   - recheck fasting lipid panel in 2 months     # HTN  Hypertensive in clinic, reports home systolic -150s  - increase lisinopril to 20mg daily  - RN to call for BP check in 2 weeks    # Myalgias  Reports new myalgias the past 4 months, suspect related to statin use. Discussed stating PCSK9i with the goal of down titrating or stopping statin.   - start Repatha 140mg q14 days  - cont atorvastatin 80mg daliy  - FLP in 2 months     # MARIELA  Has never used CPAP or other assistive devices, wakes up q30 minutes most nights.   - sleep medicine referral & sleep study ordered   - PCP referral to establish care    Follow up:  6 months  Chart review time today: 10 minutes  Visit time today: 20 minutes  Total time spent today: 30 minutes        Liana Wade CNP  General Cardiology   12/09/24          Please do not hesitate to contact me if you have any questions/concerns.     Sincerely,     DEBORAH RICHARDSON CNP

## 2024-12-23 ENCOUNTER — TELEPHONE (OUTPATIENT)
Dept: CARDIOLOGY | Facility: CLINIC | Age: 59
End: 2024-12-23
Payer: COMMERCIAL

## 2024-12-23 NOTE — TELEPHONE ENCOUNTER
S-(situation): patient states that his BP on the lisinopril at 20 mg daily runs 130/80s. Has occasional dizziness. His other complaint is that his legs ache by the end of the day. He is still on Lipitor at 80 mg daily as well as Repatha 140mg subcutaneous every 2 weeks. He is tolerating the Repatha well     B-(background): Jose has been experiencing leg cramps at the end of the day for the past 4 months, resolved in the morning. He also notes more diffuse body cramps, wonders if it's from an old hockey injury. Home systolic blood pressures have been 140-150s; he takes lisinopril in AM and often checks BP in the evening.   He has not started taking Reptha, was unsure if it was prescribed or just discussed at his prevous cardiology visit.  He had a significant family history of early on set CAD (mother with heart attack in her 50s, grandfather  of cardiac arrest in his 40s).    A-(assessment): BP normotensive. Continues to have leg cramps    R-(recommendations): asked Jose to continue to monitor BP and let us know if it is consistently about 130. Asked him to hold his Lipitor for one week to see if his leg pain improves.

## 2025-01-02 ENCOUNTER — OFFICE VISIT (OUTPATIENT)
Dept: FAMILY MEDICINE | Facility: CLINIC | Age: 60
End: 2025-01-02
Attending: CASE MANAGER/CARE COORDINATOR
Payer: COMMERCIAL

## 2025-01-02 VITALS
HEIGHT: 70 IN | HEART RATE: 92 BPM | RESPIRATION RATE: 15 BRPM | BODY MASS INDEX: 31.55 KG/M2 | SYSTOLIC BLOOD PRESSURE: 130 MMHG | DIASTOLIC BLOOD PRESSURE: 90 MMHG | TEMPERATURE: 97.3 F | OXYGEN SATURATION: 95 % | WEIGHT: 220.4 LBS

## 2025-01-02 DIAGNOSIS — Z12.5 SCREENING FOR PROSTATE CANCER: ICD-10-CM

## 2025-01-02 DIAGNOSIS — E78.5 HYPERLIPIDEMIA LDL GOAL <100: ICD-10-CM

## 2025-01-02 DIAGNOSIS — I10 BENIGN ESSENTIAL HYPERTENSION: ICD-10-CM

## 2025-01-02 DIAGNOSIS — I25.118 CORONARY ARTERY DISEASE OF NATIVE ARTERY OF NATIVE HEART WITH STABLE ANGINA PECTORIS (H): ICD-10-CM

## 2025-01-02 DIAGNOSIS — R73.03 PREDIABETES: ICD-10-CM

## 2025-01-02 DIAGNOSIS — Z12.11 SCREEN FOR COLON CANCER: ICD-10-CM

## 2025-01-02 DIAGNOSIS — Z00.00 ROUTINE GENERAL MEDICAL EXAMINATION AT A HEALTH CARE FACILITY: Primary | ICD-10-CM

## 2025-01-02 LAB
ANION GAP SERPL CALCULATED.3IONS-SCNC: 14 MMOL/L (ref 7–15)
BUN SERPL-MCNC: 18.1 MG/DL (ref 6–20)
CALCIUM SERPL-MCNC: 9.6 MG/DL (ref 8.8–10.4)
CHLORIDE SERPL-SCNC: 102 MMOL/L (ref 98–107)
CREAT SERPL-MCNC: 0.98 MG/DL (ref 0.67–1.17)
EGFRCR SERPLBLD CKD-EPI 2021: 89 ML/MIN/1.73M2
EST. AVERAGE GLUCOSE BLD GHB EST-MCNC: 120 MG/DL
GLUCOSE SERPL-MCNC: 126 MG/DL (ref 70–99)
HBA1C MFR BLD: 5.8 % (ref 0–5.6)
HCO3 SERPL-SCNC: 24 MMOL/L (ref 22–29)
POTASSIUM SERPL-SCNC: 4.8 MMOL/L (ref 3.4–5.3)
PSA SERPL DL<=0.01 NG/ML-MCNC: 0.32 NG/ML (ref 0–3.5)
SODIUM SERPL-SCNC: 140 MMOL/L (ref 135–145)

## 2025-01-02 RX ORDER — LISINOPRIL 40 MG/1
40 TABLET ORAL DAILY
Qty: 90 TABLET | Refills: 1 | Status: SHIPPED | OUTPATIENT
Start: 2025-01-02

## 2025-01-02 SDOH — HEALTH STABILITY: PHYSICAL HEALTH: ON AVERAGE, HOW MANY DAYS PER WEEK DO YOU ENGAGE IN MODERATE TO STRENUOUS EXERCISE (LIKE A BRISK WALK)?: 0 DAYS

## 2025-01-02 SDOH — HEALTH STABILITY: PHYSICAL HEALTH: ON AVERAGE, HOW MANY MINUTES DO YOU ENGAGE IN EXERCISE AT THIS LEVEL?: 0 MIN

## 2025-01-02 ASSESSMENT — SOCIAL DETERMINANTS OF HEALTH (SDOH): HOW OFTEN DO YOU GET TOGETHER WITH FRIENDS OR RELATIVES?: ONCE A WEEK

## 2025-01-02 ASSESSMENT — PAIN SCALES - GENERAL: PAINLEVEL_OUTOF10: NO PAIN (0)

## 2025-01-02 NOTE — PATIENT INSTRUCTIONS
Patient Education   Preventive Care Advice   This is general advice given by our system to help you stay healthy. However, your care team may have specific advice just for you. Please talk to your care team about your preventive care needs.  Nutrition  Eat 5 or more servings of fruits and vegetables each day.  Try wheat bread, brown rice and whole grain pasta (instead of white bread, rice, and pasta).  Get enough calcium and vitamin D. Check the label on foods and aim for 100% of the RDA (recommended daily allowance).  Lifestyle  Exercise at least 150 minutes each week  (30 minutes a day, 5 days a week).  Do muscle strengthening activities 2 days a week. These help control your weight and prevent disease.  No smoking.  Wear sunscreen to prevent skin cancer.  Have a dental exam and cleaning every 6 months.  Yearly exams  See your health care team every year to talk about:  Any changes in your health.  Any medicines your care team has prescribed.  Preventive care, family planning, and ways to prevent chronic diseases.  Shots (vaccines)   HPV shots (up to age 26), if you've never had them before.  Hepatitis B shots (up to age 59), if you've never had them before.  COVID-19 shot: Get this shot when it's due.  Flu shot: Get a flu shot every year.  Tetanus shot: Get a tetanus shot every 10 years.  Pneumococcal, hepatitis A, and RSV shots: Ask your care team if you need these based on your risk.  Shingles shot (for age 50 and up)  General health tests  Diabetes screening:  Starting at age 35, Get screened for diabetes at least every 3 years.  If you are younger than age 35, ask your care team if you should be screened for diabetes.  Cholesterol test: At age 39, start having a cholesterol test every 5 years, or more often if advised.  Bone density scan (DEXA): At age 50, ask your care team if you should have this scan for osteoporosis (brittle bones).  Hepatitis C: Get tested at least once in your life.  STIs (sexually  transmitted infections)  Before age 24: Ask your care team if you should be screened for STIs.  After age 24: Get screened for STIs if you're at risk. You are at risk for STIs (including HIV) if:  You are sexually active with more than one person.  You don't use condoms every time.  You or a partner was diagnosed with a sexually transmitted infection.  If you are at risk for HIV, ask about PrEP medicine to prevent HIV.  Get tested for HIV at least once in your life, whether you are at risk for HIV or not.  Cancer screening tests  Cervical cancer screening: If you have a cervix, begin getting regular cervical cancer screening tests starting at age 21.  Breast cancer scan (mammogram): If you've ever had breasts, begin having regular mammograms starting at age 40. This is a scan to check for breast cancer.  Colon cancer screening: It is important to start screening for colon cancer at age 45.  Have a colonoscopy test every 10 years (or more often if you're at risk) Or, ask your provider about stool tests like a FIT test every year or Cologuard test every 3 years.  To learn more about your testing options, visit:   .  For help making a decision, visit:   https://bit.ly/ta41619.  Prostate cancer screening test: If you have a prostate, ask your care team if a prostate cancer screening test (PSA) at age 55 is right for you.  Lung cancer screening: If you are a current or former smoker ages 50 to 80, ask your care team if ongoing lung cancer screenings are right for you.  For informational purposes only. Not to replace the advice of your health care provider. Copyright   2023 [facility]. All rights reserved. Clinically reviewed by the [facility] Transitions Program. [employer] 178394 - REV 01/24.  Preventing Falls: Care Instructions  Injuries and health problems such as trouble walking or poor eyesight can increase your risk of falling. So can some medicines. But there are things you can do to help  "prevent falls. You can exercise to get stronger. You can also arrange your home to make it safer.    Talk to your doctor about the medicines you take. Ask if any of them increase the risk of falls and whether they can be changed or stopped.   Try to exercise regularly. It can help improve your strength and balance. This can help lower your risk of falling.         Practice fall safety and prevention.   Wear low-heeled shoes that fit well and give your feet good support. Talk to your doctor if you have foot problems that make this hard.  Carry a cellphone or wear a medical alert device that you can use to call for help.  Use stepladders instead of chairs to reach high objects. Don't climb if you're at risk for falls. Ask for help, if needed.  Wear the correct eyeglasses, if you need them.        Make your home safer.   Remove rugs, cords, clutter, and furniture from walkways.  Keep your house well lit. Use night-lights in hallways and bathrooms.  Install and use sturdy handrails on stairways.  Wear nonskid footwear, even inside. Don't walk barefoot or in socks without shoes.        Be safe outside.   Use handrails, curb cuts, and ramps whenever possible.  Keep your hands free by using a shoulder bag or backpack.  Try to walk in well-lit areas. Watch out for uneven ground, changes in pavement, and debris.  Be careful in the winter. Walk on the grass or gravel when sidewalks are slippery. Use de-icer on steps and walkways. Add non-slip devices to shoes.    Put grab bars and nonskid mats in your shower or tub and near the toilet. Try to use a shower chair or bath bench when bathing.   Get into a tub or shower by putting in your weaker leg first. Get out with your strong side first. Have a phone or medical alert device in the bathroom with you.   Where can you learn more?  Go to https://www.HearMe[employer]wise.net/patiented  Enter G117 in the search box to learn more about \"Preventing Falls: Care Instructions.\"  Current as of: " July 31, 2024  Content Version: 14.3    2024 [employer].   Care instructions adapted under license by your healthcare professional. If you have questions about a medical condition or this instruction, always ask your healthcare professional. [employer] disclaims any warranty or liability for your use of this information.    Learning About Stress  What is stress?     Stress is your body's response to a hard situation. Your body can have a physical, emotional, or mental response. Stress is a fact of life for most people, and it affects everyone differently. What causes stress for you may not be stressful for someone else.  A lot of things can cause stress. You may feel stress when you go on a job interview, take a test, or run a race. This kind of short-term stress is normal and even useful. It can help you if you need to work hard or react quickly. For example, stress can help you finish an important job on time.  Long-term stress is caused by ongoing stressful situations or events. Examples of long-term stress include long-term health problems, ongoing problems at work, or conflicts in your family. Long-term stress can harm your health.  How does stress affect your health?  When you are stressed, your body responds as though you are in danger. It makes hormones that speed up your heart, make you breathe faster, and give you a burst of energy. This is called the fight-or-flight stress response. If the stress is over quickly, your body goes back to normal and no harm is done.  But if stress happens too often or lasts too long, it can have bad effects. Long-term stress can make you more likely to get sick, and it can make symptoms of some diseases worse. If you tense up when you are stressed, you may develop neck, shoulder, or low back pain. Stress is linked to high blood pressure and heart disease.  Stress also harms your emotional health. It can make you rivera, tense, or depressed. Your  relationships may suffer, and you may not do well at work or school.  What can you do to manage stress?  You can try these things to help manage stress:   Do something active. Exercise or activity can help reduce stress. Walking is a great way to get started. Even everyday activities such as housecleaning or yard work can help.  Try yoga or thierry chi. These techniques combine exercise and meditation. You may need some training at first to learn them.  Do something you enjoy. For example, listen to music or go to a movie. Practice your hobby or do volunteer work.  Meditate. This can help you relax, because you are not worrying about what happened before or what may happen in the future.  Do guided imagery. Imagine yourself in any setting that helps you feel calm. You can use online videos, books, or a teacher to guide you.  Do breathing exercises. For example:  From a standing position, bend forward from the waist with your knees slightly bent. Let your arms dangle close to the floor.  Breathe in slowly and deeply as you return to a standing position. Roll up slowly and lift your head last.  Hold your breath for just a few seconds in the standing position.  Breathe out slowly and bend forward from the waist.  Let your feelings out. Talk, laugh, cry, and express anger when you need to. Talking with supportive friends or family, a counselor, or a angel leader about your feelings is a healthy way to relieve stress. Avoid discussing your feelings with people who make you feel worse.  Write. It may help to write about things that are bothering you. This helps you find out how much stress you feel and what is causing it. When you know this, you can find better ways to cope.  What can you do to prevent stress?  You might try some of these things to help prevent stress:  Manage your time. This helps you find time to do the things you want and need to do.  Get enough sleep. Your body recovers from the stresses of the day while  "you are sleeping.  Get support. Your family, friends, and community can make a difference in how you experience stress.  Limit your news feed. Avoid or limit time on social media or news that may make you feel stressed.  Do something active. Exercise or activity can help reduce stress. Walking is a great way to get started.  Where can you learn more?  Go to https://www.[employer].net/patiented  Enter N032 in the search box to learn more about \"Learning About Stress.\"  Current as of: October 24, 2023  Content Version: 14.3    2024 [employer].   Care instructions adapted under license by your healthcare professional. If you have questions about a medical condition or this instruction, always ask your healthcare professional. [employer] disclaims any warranty or liability for your use of this information.       "

## 2025-01-02 NOTE — NURSING NOTE
Prior to immunization administration, verified patients identity using patient s name and date of birth. Please see Immunization Activity for additional information.     Screening Questionnaire for Adult Immunization    Are you sick today?   No   Do you have allergies to medications, food, a vaccine component or latex?   No   Have you ever had a serious reaction after receiving a vaccination?   No   Do you have a long-term health problem with heart, lung, kidney, or metabolic disease (e.g., diabetes), asthma, a blood disorder, no spleen, complement component deficiency, a cochlear implant, or a spinal fluid leak?  Are you on long-term aspirin therapy?   No   Do you have cancer, leukemia, HIV/AIDS, or any other immune system problem?   No   Do you have a parent, brother, or sister with an immune system problem?   No   In the past 3 months, have you taken medications that affect  your immune system, such as prednisone, other steroids, or anticancer drugs; drugs for the treatment of rheumatoid arthritis, Crohn s disease, or psoriasis; or have you had radiation treatments?   No   Have you had a seizure, or a brain or other nervous system problem?   No   During the past year, have you received a transfusion of blood or blood    products, or been given immune (gamma) globulin or antiviral drug?   No   For women: Are you pregnant or is there a chance you could become       pregnant during the next month?   No   Have you received any vaccinations in the past 4 weeks?   No     Immunization questionnaire answers were all negative.      Patient instructed to remain in clinic for 15 minutes afterwards, and to report any adverse reactions.     Screening performed by Meri Freeman MA on 1/2/2025 at 11:13 AM.

## 2025-01-02 NOTE — PROGRESS NOTES
"Preventive Care Visit  [facility]  Amaya Carney DNP, Nurse Practitioner Primary Care  Jan 2, 2025      Assessment & Plan     Routine general medical examination at a health care facility  Reviewed medical/social/family history and health maintenance   Recommended Shingrix through the pharmacy.  We should also consider PCV20 given risk factors and we will revisit this at his next preventative.  Flu, COVID, TDAP, and Twinrix given today.    Benign essential hypertension  Continues to be elevated.  Will increase his lisinopril to 40mg and he was instructed to send in home readings in 2-3 weeks.  He also has sleep medicine evaluation coming up.    - Primary Care Referral  - BASIC METABOLIC PANEL; Future  - BASIC METABOLIC PANEL    Coronary artery disease of native artery of native heart with stable angina pectoris (H)  Follows with cardiology.  Recently started on Repatha for possible statin intolerance.  Unclear if he has tried rosuvastatin which may also be an option.  Will defer to cardiology.    - Primary Care Referral  - lisinopril (ZESTRIL) 40 MG tablet; Take 1 tablet (40 mg) by mouth daily.    Hyperlipidemia LDL goal <100  See above    Prediabetes  - Hemoglobin A1c; Future  - Hemoglobin A1c    Screening for prostate cancer  - PSA, screen; Future  - PSA, screen    Screen for colon cancer  - Colonoscopy Screening  Referral; Future    Patient has been advised of split billing requirements and indicates understanding: Yes        BMI  Estimated body mass index is 31.62 kg/m  as calculated from the following:    Height as of this encounter: 1.778 m (5' 10\").    Weight as of this encounter: 100 kg (220 lb 6.4 oz).   Weight management plan: Discussed healthy diet and exercise guidelines    Counseling  Appropriate preventive services were addressed with this patient via screening, questionnaire, or discussion as appropriate for fall prevention, nutrition, physical activity, " Tobacco-use cessation, social engagement, weight loss and cognition.  Checklist reviewing preventive services available has been given to the patient.  Reviewed patient's diet, addressing concerns and/or questions.   The patient was instructed to see the dentist every 6 months.   He is at risk for psychosocial distress and has been provided with information to reduce risk.           Subjective   Jose is a 59 year old, presenting for the following:  Physical        1/2/2025    10:18 AM   Additional Questions   Roomed by Meri MORENO   Accompanied by self         1/2/2025    10:18 AM   Patient Reported Additional Medications   Patient reports taking the following new medications no         HPI    Health Care Directive  Patient does not have a Health Care Directive: Discussed advance care planning with patient; information given to patient to review.      1/2/2025   General Health   How would you rate your overall physical health? (!) FAIR   Feel stress (tense, anxious, or unable to sleep) To some extent   (!) STRESS CONCERN      1/2/2025   Nutrition   Three or more servings of calcium each day? Yes   Diet: Regular (no restrictions)   How many servings of fruit and vegetables per day? (!) 0-1   How many sweetened beverages each day? 0-1         1/2/2025   Exercise   Days per week of moderate/strenous exercise 0 days   Average minutes spent exercising at this level 0 min   (!) EXERCISE CONCERN      1/2/2025   Social Factors   Frequency of gathering with friends or relatives Once a week   Worry food won't last until get money to buy more No   Food not last or not have enough money for food? No   Do you have housing? (Housing is defined as stable permanent housing and does not include staying ouside in a car, in a tent, in an abandoned building, in an overnight shelter, or couch-surfing.) Yes   Are you worried about losing your housing? No   Lack of transportation? No   Unable to get utilities (heat,electricity)? No          "1/2/2025   Fall Risk   Fallen 2 or more times in the past year? No   Trouble with walking or balance? Yes           1/2/2025   Dental   Dentist two times every year? (!) NO         1/2/2025   TB Screening   Were you born outside of the [place]? No         Today's PHQ-2 Score:       1/2/2025    10:08 AM   PHQ-2 ( 1999 [employer])   Q1: Little interest or pleasure in doing things 0   Q2: Feeling down, depressed or hopeless 0   PHQ-2 Score 0    Q1: Little interest or pleasure in doing things Not at all   Q2: Feeling down, depressed or hopeless Not at all   PHQ-2 Score 0       Patient-reported           1/2/2025   Substance Use   Alcohol more than 3/day or more than 7/wk No   Do you use any other substances recreationally? No     Social History     Tobacco Use    Smoking status: Never     Passive exposure: Never    Smokeless tobacco: Never   Vaping Use    Vaping status: Never Used   Substance Use Topics    Alcohol use: Yes     Comment: 3-4 wk    Drug use: No           1/2/2025   STI Screening   New sexual partner(s) since last STI/HIV test? No   Last PSA:   Prostate Specific Antigen Screen   Date Value Ref Range Status   04/20/2023 0.24 0.00 - 3.50 ng/mL Final     ASCVD Risk   The ASCVD Risk score (Taran RUSSELL, et al., 2019) failed to calculate for the following reasons:    Risk score cannot be calculated because patient has a medical history suggesting prior/existing ASCVD           Reviewed and updated as needed this visit by Provider                             Objective    Exam  BP (!) 130/90 (BP Location: Right arm, Patient Position: Sitting, Cuff Size: Adult Large)   Pulse 92   Temp 97.3  F (36.3  C) (Temporal)   Resp 15   Ht 1.778 m (5' 10\")   Wt 100 kg (220 lb 6.4 oz)   SpO2 95%   BMI 31.62 kg/m     Estimated body mass index is 31.62 kg/m  as calculated from the following:    Height as of this encounter: 1.778 m (5' 10\").    Weight as of this encounter: 100 kg (220 lb 6.4 oz).    Physical Exam  GENERAL: " alert and no distress  EYES: Eyes grossly normal to inspection, PERRL and conjunctivae and sclerae normal  HENT: ear canals and TM's normal, nose and mouth without ulcers or lesions  NECK: no adenopathy, no asymmetry, masses, or scars  RESP: lungs clear to auscultation - no rales, rhonchi or wheezes  CV: regular rate and rhythm, normal S1 S2, no S3 or S4, no murmur, click or rub, no peripheral edema  ABDOMEN: soft, nontender, no hepatosplenomegaly, no masses and bowel sounds normal  MS: no gross musculoskeletal defects noted, no edema  SKIN: no suspicious lesions or rashes  NEURO: Normal strength and tone, mentation intact and speech normal  PSYCH: mentation appears normal, affect normal/bright        Signed Electronically by: Amaya Carney DNP

## 2025-02-04 ENCOUNTER — TELEPHONE (OUTPATIENT)
Dept: CARDIOLOGY | Facility: CLINIC | Age: 60
End: 2025-02-04
Payer: COMMERCIAL

## 2025-02-04 NOTE — TELEPHONE ENCOUNTER
Patient Contacted for the patient to call back and schedule the following:    Appointment type: RTN CARDIO  Provider: VON  Return date: 6/19/2025  Specialty phone number: [phone] OPT 1  Additional appointment(s) needed: N/A  Additonal Notes: CARILE RESCHEDULE

## 2025-05-05 ENCOUNTER — TELEPHONE (OUTPATIENT)
Dept: CARDIOLOGY | Facility: CLINIC | Age: 60
End: 2025-05-05

## 2025-05-05 NOTE — TELEPHONE ENCOUNTER
Called and spoke with patient about scheduling fasting labs prior to [place] appointment on 6/19/25. Patient is requesting a call back in about a week to schedule. He's currently having issues with his insurance he's trying to straighten out.    Bayron Burnette   [facility]- Cardiology   [address] [place] [id number]

## 2025-06-16 ENCOUNTER — TELEPHONE (OUTPATIENT)
Dept: CARDIOLOGY | Facility: CLINIC | Age: 60
End: 2025-06-16

## 2025-06-16 NOTE — TELEPHONE ENCOUNTER
Left Voicemail (1st Attempt) and Sent Mychart (1st Attempt) for the patient to call back and schedule the following:    Appointment type: Return Cardio  Provider: [facility]  Return date: next available  Specialty phone number: [phone] Opt 1  Additional appointment(s) needed: NA  Additonal Notes: NA

## (undated) DEVICE — CATH GUIDING BLUE YELLOW PTFE XB3.5 6FRX100CM 67005400

## (undated) DEVICE — SLEEVE TR BAND RADIAL COMPRESSION DEVICE 24CM TRB24-REG

## (undated) DEVICE — INTRO GLIDESHEATH SLENDER 6FR 10X45CM 60-1060

## (undated) DEVICE — TUBING PRESSURE 30"

## (undated) DEVICE — KIT HAND CONTROL ACIST 014644 AR-P54

## (undated) DEVICE — Device

## (undated) DEVICE — CATH BALLOON NC EMERGE 2.75X12MM H7493926712270

## (undated) DEVICE — CATH GUIDING JR5 6FR .070IN 100CM

## (undated) DEVICE — GW VASC .035IN DIA 260CML 7CML 3 MM RADIUS J CURVE 502455

## (undated) DEVICE — KIT ACCESSORY INTRO INFLATION SYS 20/30 PRIORITY 1000186-115

## (undated) DEVICE — CATH BALLOON NC EMERGE 3.00X15MM H7493926715300

## (undated) DEVICE — CATH BALLOON EMERGE 2.5X20MM H7493918920250

## (undated) DEVICE — VALVE HEMOSTASIS .096" COPILOT MECH 1003331

## (undated) DEVICE — GUIDEWIRE VASC 0.014INX180CM RUNTHROUGH 25-1011

## (undated) DEVICE — CATH GUIDING 100CM 6FR .070IN VSTBR

## (undated) DEVICE — PACK HEART LEFT CUSTOM

## (undated) RX ORDER — NITROGLYCERIN 5 MG/ML
VIAL (ML) INTRAVENOUS
Status: DISPENSED
Start: 2021-12-17

## (undated) RX ORDER — NICARDIPINE HCL-0.9% SOD CHLOR 1 MG/10 ML
SYRINGE (ML) INTRAVENOUS
Status: DISPENSED
Start: 2021-12-17

## (undated) RX ORDER — FENTANYL CITRATE 50 UG/ML
INJECTION, SOLUTION INTRAMUSCULAR; INTRAVENOUS
Status: DISPENSED
Start: 2021-12-17

## (undated) RX ORDER — HEPARIN SODIUM 1000 [USP'U]/ML
INJECTION, SOLUTION INTRAVENOUS; SUBCUTANEOUS
Status: DISPENSED
Start: 2021-12-17